# Patient Record
Sex: FEMALE | Race: WHITE | NOT HISPANIC OR LATINO | Employment: FULL TIME | URBAN - METROPOLITAN AREA
[De-identification: names, ages, dates, MRNs, and addresses within clinical notes are randomized per-mention and may not be internally consistent; named-entity substitution may affect disease eponyms.]

---

## 2019-04-24 ENCOUNTER — OFFICE VISIT CONVERTED (OUTPATIENT)
Dept: FAMILY MEDICINE CLINIC | Facility: CLINIC | Age: 59
End: 2019-04-24
Attending: NURSE PRACTITIONER

## 2019-08-07 ENCOUNTER — OFFICE VISIT CONVERTED (OUTPATIENT)
Dept: FAMILY MEDICINE CLINIC | Facility: CLINIC | Age: 59
End: 2019-08-07
Attending: NURSE PRACTITIONER

## 2019-10-02 ENCOUNTER — OFFICE VISIT CONVERTED (OUTPATIENT)
Dept: FAMILY MEDICINE CLINIC | Facility: CLINIC | Age: 59
End: 2019-10-02
Attending: NURSE PRACTITIONER

## 2019-10-02 ENCOUNTER — HOSPITAL ENCOUNTER (OUTPATIENT)
Dept: FAMILY MEDICINE CLINIC | Facility: CLINIC | Age: 59
Discharge: HOME | End: 2019-10-02
Attending: NURSE PRACTITIONER

## 2019-10-02 LAB
25(OH)D3 SERPL-MCNC: 25.7 NG/ML (ref 30–100)
ALBUMIN SERPL-MCNC: 4.5 G/DL (ref 3.5–5)
ALBUMIN/GLOB SERPL: 1.4 {RATIO} (ref 1.4–2.6)
ALP SERPL-CCNC: 60 U/L (ref 53–141)
ALT SERPL-CCNC: 14 U/L (ref 10–40)
ANION GAP SERPL CALC-SCNC: 19 MMOL/L (ref 8–19)
AST SERPL-CCNC: 15 U/L (ref 15–50)
BASOPHILS # BLD AUTO: 0.08 10*3/UL (ref 0–0.2)
BASOPHILS NFR BLD AUTO: 0.7 % (ref 0–3)
BILIRUB SERPL-MCNC: 0.34 MG/DL (ref 0.2–1.3)
BUN SERPL-MCNC: 23 MG/DL (ref 5–25)
BUN/CREAT SERPL: 17 {RATIO} (ref 6–20)
CALCIUM SERPL-MCNC: 9.6 MG/DL (ref 8.7–10.4)
CHLORIDE SERPL-SCNC: 101 MMOL/L (ref 99–111)
CHOLEST SERPL-MCNC: 119 MG/DL (ref 107–200)
CHOLEST/HDLC SERPL: 3 {RATIO} (ref 3–6)
CONV ABS IMM GRAN: 0.04 10*3/UL (ref 0–0.2)
CONV CO2: 23 MMOL/L (ref 22–32)
CONV CREATININE URINE, RANDOM: 161.8 MG/DL (ref 10–300)
CONV IMMATURE GRAN: 0.3 % (ref 0–1.8)
CONV MICROALBUM.,U,RANDOM: 760.4 MG/L (ref 0–20)
CONV TOTAL PROTEIN: 7.7 G/DL (ref 6.3–8.2)
CREAT UR-MCNC: 1.32 MG/DL (ref 0.5–0.9)
DEPRECATED RDW RBC AUTO: 46.3 FL (ref 36.4–46.3)
EOSINOPHIL # BLD AUTO: 0.16 10*3/UL (ref 0–0.7)
EOSINOPHIL # BLD AUTO: 1.3 % (ref 0–7)
ERYTHROCYTE [DISTWIDTH] IN BLOOD BY AUTOMATED COUNT: 13.2 % (ref 11.7–14.4)
EST. AVERAGE GLUCOSE BLD GHB EST-MCNC: 148 MG/DL
GFR SERPLBLD BASED ON 1.73 SQ M-ARVRAT: 44 ML/MIN/{1.73_M2}
GLOBULIN UR ELPH-MCNC: 3.2 G/DL (ref 2–3.5)
GLUCOSE SERPL-MCNC: 75 MG/DL (ref 65–99)
HBA1C MFR BLD: 6.8 % (ref 3.5–5.7)
HCT VFR BLD AUTO: 48.2 % (ref 37–47)
HDLC SERPL-MCNC: 40 MG/DL (ref 40–60)
HGB BLD-MCNC: 15.1 G/DL (ref 12–16)
LDLC SERPL CALC-MCNC: 53 MG/DL (ref 70–100)
LYMPHOCYTES # BLD AUTO: 3.99 10*3/UL (ref 1–5)
LYMPHOCYTES NFR BLD AUTO: 32.9 % (ref 20–45)
MCH RBC QN AUTO: 29.7 PG (ref 27–31)
MCHC RBC AUTO-ENTMCNC: 31.3 G/DL (ref 33–37)
MCV RBC AUTO: 94.7 FL (ref 81–99)
MICROALBUMIN/CREAT UR: 470 MG/G{CRE} (ref 0–35)
MONOCYTES # BLD AUTO: 0.92 10*3/UL (ref 0.2–1.2)
MONOCYTES NFR BLD AUTO: 7.6 % (ref 3–10)
NEUTROPHILS # BLD AUTO: 6.92 10*3/UL (ref 2–8)
NEUTROPHILS NFR BLD AUTO: 57.2 % (ref 30–85)
NRBC CBCN: 0 % (ref 0–0.7)
OSMOLALITY SERPL CALC.SUM OF ELEC: 288 MOSM/KG (ref 273–304)
PLATELET # BLD AUTO: 254 10*3/UL (ref 130–400)
PMV BLD AUTO: 12 FL (ref 9.4–12.3)
POTASSIUM SERPL-SCNC: 4.9 MMOL/L (ref 3.5–5.3)
RBC # BLD AUTO: 5.09 10*6/UL (ref 4.2–5.4)
SODIUM SERPL-SCNC: 138 MMOL/L (ref 135–147)
T4 FREE SERPL-MCNC: 1.5 NG/DL (ref 0.9–1.8)
TRIGL SERPL-MCNC: 129 MG/DL (ref 40–150)
TSH SERPL-ACNC: 1.49 M[IU]/L (ref 0.27–4.2)
VLDLC SERPL-MCNC: 26 MG/DL (ref 5–37)
WBC # BLD AUTO: 12.11 10*3/UL (ref 4.8–10.8)

## 2019-10-03 LAB
CONV HEPATITIS C AB WITH REFLEX TO CONFIRMATION: <0.1 S/CO RATIO (ref 0–0.9)
CONV HEPATITIS COMMENT: NORMAL

## 2020-01-15 ENCOUNTER — HOSPITAL ENCOUNTER (OUTPATIENT)
Dept: FAMILY MEDICINE CLINIC | Facility: CLINIC | Age: 60
Discharge: HOME | End: 2020-01-15
Attending: NURSE PRACTITIONER

## 2020-01-15 ENCOUNTER — OFFICE VISIT CONVERTED (OUTPATIENT)
Dept: FAMILY MEDICINE CLINIC | Facility: CLINIC | Age: 60
End: 2020-01-15
Attending: NURSE PRACTITIONER

## 2020-01-15 LAB
ALBUMIN SERPL-MCNC: 4.1 G/DL (ref 3.5–5)
ALBUMIN/GLOB SERPL: 1.5 {RATIO} (ref 1.4–2.6)
ALP SERPL-CCNC: 32 U/L (ref 53–141)
ALT SERPL-CCNC: 12 U/L (ref 10–40)
ANION GAP SERPL CALC-SCNC: 16 MMOL/L (ref 8–19)
AST SERPL-CCNC: 14 U/L (ref 15–50)
BASOPHILS # BLD AUTO: 0.09 10*3/UL (ref 0–0.2)
BASOPHILS NFR BLD AUTO: 1 % (ref 0–3)
BILIRUB SERPL-MCNC: 0.17 MG/DL (ref 0.2–1.3)
BUN SERPL-MCNC: 11 MG/DL (ref 5–25)
BUN/CREAT SERPL: 16 {RATIO} (ref 6–20)
CALCIUM SERPL-MCNC: 9.8 MG/DL (ref 8.7–10.4)
CHLORIDE SERPL-SCNC: 105 MMOL/L (ref 99–111)
CHOLEST SERPL-MCNC: 121 MG/DL (ref 107–200)
CHOLEST/HDLC SERPL: 2.6 {RATIO} (ref 3–6)
CONV ABS IMM GRAN: 0.02 10*3/UL (ref 0–0.2)
CONV CO2: 27 MMOL/L (ref 22–32)
CONV IMMATURE GRAN: 0.2 % (ref 0–1.8)
CONV TOTAL PROTEIN: 6.8 G/DL (ref 6.3–8.2)
CREAT UR-MCNC: 0.7 MG/DL (ref 0.5–0.9)
DEPRECATED RDW RBC AUTO: 47.1 FL (ref 36.4–46.3)
EOSINOPHIL # BLD AUTO: 0.17 10*3/UL (ref 0–0.7)
EOSINOPHIL # BLD AUTO: 1.9 % (ref 0–7)
ERYTHROCYTE [DISTWIDTH] IN BLOOD BY AUTOMATED COUNT: 12.9 % (ref 11.7–14.4)
EST. AVERAGE GLUCOSE BLD GHB EST-MCNC: 163 MG/DL
GFR SERPLBLD BASED ON 1.73 SQ M-ARVRAT: >60 ML/MIN/{1.73_M2}
GLOBULIN UR ELPH-MCNC: 2.7 G/DL (ref 2–3.5)
GLUCOSE SERPL-MCNC: 64 MG/DL (ref 65–99)
HBA1C MFR BLD: 7.3 % (ref 3.5–5.7)
HCT VFR BLD AUTO: 43.4 % (ref 37–47)
HDLC SERPL-MCNC: 46 MG/DL (ref 40–60)
HGB BLD-MCNC: 13.2 G/DL (ref 12–16)
LDLC SERPL CALC-MCNC: 63 MG/DL (ref 70–100)
LYMPHOCYTES # BLD AUTO: 3.4 10*3/UL (ref 1–5)
LYMPHOCYTES NFR BLD AUTO: 38.9 % (ref 20–45)
MCH RBC QN AUTO: 30.1 PG (ref 27–31)
MCHC RBC AUTO-ENTMCNC: 30.4 G/DL (ref 33–37)
MCV RBC AUTO: 98.9 FL (ref 81–99)
MONOCYTES # BLD AUTO: 0.65 10*3/UL (ref 0.2–1.2)
MONOCYTES NFR BLD AUTO: 7.4 % (ref 3–10)
NEUTROPHILS # BLD AUTO: 4.41 10*3/UL (ref 2–8)
NEUTROPHILS NFR BLD AUTO: 50.6 % (ref 30–85)
NRBC CBCN: 0 % (ref 0–0.7)
OSMOLALITY SERPL CALC.SUM OF ELEC: 293 MOSM/KG (ref 273–304)
PLATELET # BLD AUTO: 310 10*3/UL (ref 130–400)
PMV BLD AUTO: 11.5 FL (ref 9.4–12.3)
POTASSIUM SERPL-SCNC: 4.7 MMOL/L (ref 3.5–5.3)
RBC # BLD AUTO: 4.39 10*6/UL (ref 4.2–5.4)
SODIUM SERPL-SCNC: 143 MMOL/L (ref 135–147)
T4 FREE SERPL-MCNC: 1.3 NG/DL (ref 0.9–1.8)
TRIGL SERPL-MCNC: 58 MG/DL (ref 40–150)
TSH SERPL-ACNC: 1.53 M[IU]/L (ref 0.27–4.2)
VLDLC SERPL-MCNC: 12 MG/DL (ref 5–37)
WBC # BLD AUTO: 8.74 10*3/UL (ref 4.8–10.8)

## 2020-01-16 LAB — 25(OH)D3 SERPL-MCNC: 22.5 NG/ML (ref 30–100)

## 2020-06-04 ENCOUNTER — HOSPITAL ENCOUNTER (OUTPATIENT)
Dept: FAMILY MEDICINE CLINIC | Facility: CLINIC | Age: 60
Discharge: HOME | End: 2020-06-04
Attending: NURSE PRACTITIONER

## 2020-06-04 ENCOUNTER — OFFICE VISIT CONVERTED (OUTPATIENT)
Dept: FAMILY MEDICINE CLINIC | Facility: CLINIC | Age: 60
End: 2020-06-04
Attending: NURSE PRACTITIONER

## 2020-06-04 LAB
25(OH)D3 SERPL-MCNC: 19.4 NG/ML (ref 30–100)
ALBUMIN SERPL-MCNC: 4.3 G/DL (ref 3.5–5)
ALBUMIN/GLOB SERPL: 1.5 {RATIO} (ref 1.4–2.6)
ALP SERPL-CCNC: 41 U/L (ref 53–141)
ALT SERPL-CCNC: 14 U/L (ref 10–40)
ANION GAP SERPL CALC-SCNC: 14 MMOL/L (ref 8–19)
AST SERPL-CCNC: 13 U/L (ref 15–50)
BASOPHILS # BLD AUTO: 0.07 10*3/UL (ref 0–0.2)
BASOPHILS NFR BLD AUTO: 0.8 % (ref 0–3)
BILIRUB SERPL-MCNC: 0.2 MG/DL (ref 0.2–1.3)
BUN SERPL-MCNC: 19 MG/DL (ref 5–25)
BUN/CREAT SERPL: 20 {RATIO} (ref 6–20)
CALCIUM SERPL-MCNC: 9.6 MG/DL (ref 8.7–10.4)
CHLORIDE SERPL-SCNC: 102 MMOL/L (ref 99–111)
CHOLEST SERPL-MCNC: 199 MG/DL (ref 107–200)
CHOLEST/HDLC SERPL: 5 {RATIO} (ref 3–6)
CONV ABS IMM GRAN: 0.03 10*3/UL (ref 0–0.2)
CONV CO2: 28 MMOL/L (ref 22–32)
CONV IMMATURE GRAN: 0.4 % (ref 0–1.8)
CONV TOTAL PROTEIN: 7.2 G/DL (ref 6.3–8.2)
CREAT UR-MCNC: 0.97 MG/DL (ref 0.5–0.9)
DEPRECATED RDW RBC AUTO: 45.7 FL (ref 36.4–46.3)
EOSINOPHIL # BLD AUTO: 0.19 10*3/UL (ref 0–0.7)
EOSINOPHIL # BLD AUTO: 2.2 % (ref 0–7)
ERYTHROCYTE [DISTWIDTH] IN BLOOD BY AUTOMATED COUNT: 13 % (ref 11.7–14.4)
EST. AVERAGE GLUCOSE BLD GHB EST-MCNC: 174 MG/DL
GFR SERPLBLD BASED ON 1.73 SQ M-ARVRAT: >60 ML/MIN/{1.73_M2}
GLOBULIN UR ELPH-MCNC: 2.9 G/DL (ref 2–3.5)
GLUCOSE SERPL-MCNC: 112 MG/DL (ref 65–99)
HBA1C MFR BLD: 7.7 % (ref 3.5–5.7)
HCT VFR BLD AUTO: 46.1 % (ref 37–47)
HDLC SERPL-MCNC: 40 MG/DL (ref 40–60)
HGB BLD-MCNC: 14.3 G/DL (ref 12–16)
LDLC SERPL CALC-MCNC: 120 MG/DL (ref 70–100)
LYMPHOCYTES # BLD AUTO: 3.75 10*3/UL (ref 1–5)
LYMPHOCYTES NFR BLD AUTO: 44.1 % (ref 20–45)
MCH RBC QN AUTO: 29.7 PG (ref 27–31)
MCHC RBC AUTO-ENTMCNC: 31 G/DL (ref 33–37)
MCV RBC AUTO: 95.6 FL (ref 81–99)
MONOCYTES # BLD AUTO: 0.63 10*3/UL (ref 0.2–1.2)
MONOCYTES NFR BLD AUTO: 7.4 % (ref 3–10)
NEUTROPHILS # BLD AUTO: 3.84 10*3/UL (ref 2–8)
NEUTROPHILS NFR BLD AUTO: 45.1 % (ref 30–85)
NRBC CBCN: 0 % (ref 0–0.7)
OSMOLALITY SERPL CALC.SUM OF ELEC: 291 MOSM/KG (ref 273–304)
PLATELET # BLD AUTO: 275 10*3/UL (ref 130–400)
PMV BLD AUTO: 11.8 FL (ref 9.4–12.3)
POTASSIUM SERPL-SCNC: 4.7 MMOL/L (ref 3.5–5.3)
RBC # BLD AUTO: 4.82 10*6/UL (ref 4.2–5.4)
SODIUM SERPL-SCNC: 139 MMOL/L (ref 135–147)
T4 FREE SERPL-MCNC: 1.3 NG/DL (ref 0.9–1.8)
TRIGL SERPL-MCNC: 197 MG/DL (ref 40–150)
TSH SERPL-ACNC: 1.17 M[IU]/L (ref 0.27–4.2)
VLDLC SERPL-MCNC: 39 MG/DL (ref 5–37)
WBC # BLD AUTO: 8.51 10*3/UL (ref 4.8–10.8)

## 2020-10-07 ENCOUNTER — OFFICE VISIT CONVERTED (OUTPATIENT)
Dept: FAMILY MEDICINE CLINIC | Facility: CLINIC | Age: 60
End: 2020-10-07
Attending: NURSE PRACTITIONER

## 2021-03-26 ENCOUNTER — OFFICE VISIT CONVERTED (OUTPATIENT)
Dept: FAMILY MEDICINE CLINIC | Facility: CLINIC | Age: 61
End: 2021-03-26
Attending: NURSE PRACTITIONER

## 2021-03-26 ENCOUNTER — HOSPITAL ENCOUNTER (OUTPATIENT)
Dept: FAMILY MEDICINE CLINIC | Facility: CLINIC | Age: 61
Discharge: HOME | End: 2021-03-26
Attending: NURSE PRACTITIONER

## 2021-03-26 ENCOUNTER — CONVERSION ENCOUNTER (OUTPATIENT)
Dept: FAMILY MEDICINE CLINIC | Facility: CLINIC | Age: 61
End: 2021-03-26

## 2021-03-26 LAB
25(OH)D3 SERPL-MCNC: 23.2 NG/ML (ref 30–100)
ALBUMIN SERPL-MCNC: 3.8 G/DL (ref 3.5–5)
ALBUMIN/GLOB SERPL: 1.3 {RATIO} (ref 1.4–2.6)
ALP SERPL-CCNC: 49 U/L (ref 53–141)
ALT SERPL-CCNC: 15 U/L (ref 10–40)
AMPHET UR QL CFM: NEGATIVE
ANION GAP SERPL CALC-SCNC: 12 MMOL/L (ref 8–19)
AST SERPL-CCNC: 13 U/L (ref 15–50)
BARBITURATES UR QL: NEGATIVE
BASOPHILS # BLD AUTO: 0.06 10*3/UL (ref 0–0.2)
BASOPHILS NFR BLD AUTO: 0.7 % (ref 0–3)
BENZODIAZ UR QL SCN: NEGATIVE
BILIRUB SERPL-MCNC: 0.21 MG/DL (ref 0.2–1.3)
BUN SERPL-MCNC: 13 MG/DL (ref 5–25)
BUN/CREAT SERPL: 16 {RATIO} (ref 6–20)
CALCIUM SERPL-MCNC: 9.6 MG/DL (ref 8.7–10.4)
CHLORIDE SERPL-SCNC: 104 MMOL/L (ref 99–111)
CHOLEST SERPL-MCNC: 100 MG/DL (ref 107–200)
CHOLEST/HDLC SERPL: 2.6 {RATIO} (ref 3–6)
CONV ABS IMM GRAN: 0.03 10*3/UL (ref 0–0.2)
CONV AMP/METHAMP UR: NEGATIVE
CONV CO2: 29 MMOL/L (ref 22–32)
CONV COCAINE, UR: NEGATIVE
CONV CREATININE URINE, RANDOM: 102.8 MG/DL (ref 10–300)
CONV IMMATURE GRAN: 0.4 % (ref 0–1.8)
CONV MICROALBUM.,U,RANDOM: <12 MG/L (ref 0–20)
CONV TOTAL PROTEIN: 6.8 G/DL (ref 6.3–8.2)
CREAT UR-MCNC: 0.8 MG/DL (ref 0.5–0.9)
DEPRECATED RDW RBC AUTO: 42.6 FL (ref 36.4–46.3)
EOSINOPHIL # BLD AUTO: 0.15 10*3/UL (ref 0–0.7)
EOSINOPHIL # BLD AUTO: 1.8 % (ref 0–7)
ERYTHROCYTE [DISTWIDTH] IN BLOOD BY AUTOMATED COUNT: 12.3 % (ref 11.7–14.4)
EST. AVERAGE GLUCOSE BLD GHB EST-MCNC: 226 MG/DL
GFR SERPLBLD BASED ON 1.73 SQ M-ARVRAT: >60 ML/MIN/{1.73_M2}
GLOBULIN UR ELPH-MCNC: 3 G/DL (ref 2–3.5)
GLUCOSE SERPL-MCNC: 190 MG/DL (ref 65–99)
HBA1C MFR BLD: 9.5 % (ref 3.5–5.7)
HCT VFR BLD AUTO: 45.6 % (ref 37–47)
HDLC SERPL-MCNC: 38 MG/DL (ref 40–60)
HGB BLD-MCNC: 14.7 G/DL (ref 12–16)
LDLC SERPL CALC-MCNC: 46 MG/DL (ref 70–100)
LYMPHOCYTES # BLD AUTO: 1.76 10*3/UL (ref 1–5)
LYMPHOCYTES NFR BLD AUTO: 21.1 % (ref 20–45)
MCH RBC QN AUTO: 30.2 PG (ref 27–31)
MCHC RBC AUTO-ENTMCNC: 32.2 G/DL (ref 33–37)
MCV RBC AUTO: 93.8 FL (ref 81–99)
MDMA UR QL SCN: NEGATIVE
METHADONE UR QL SCN: NEGATIVE
MICROALBUMIN/CREAT UR: 11.7 MG/G{CRE} (ref 0–35)
MONOCYTES # BLD AUTO: 0.75 10*3/UL (ref 0.2–1.2)
MONOCYTES NFR BLD AUTO: 9 % (ref 3–10)
NEUTROPHILS # BLD AUTO: 5.59 10*3/UL (ref 2–8)
NEUTROPHILS NFR BLD AUTO: 67 % (ref 30–85)
NRBC CBCN: 0 % (ref 0–0.7)
OPIATES UR QL SCN: NEGATIVE
OSMOLALITY SERPL CALC.SUM OF ELEC: 295 MOSM/KG (ref 273–304)
OXYCODONE UR QL SCN: NEGATIVE
PCP UR QL: NEGATIVE
PLATELET # BLD AUTO: 243 10*3/UL (ref 130–400)
PMV BLD AUTO: 11.7 FL (ref 9.4–12.3)
POTASSIUM SERPL-SCNC: 5.1 MMOL/L (ref 3.5–5.3)
RBC # BLD AUTO: 4.86 10*6/UL (ref 4.2–5.4)
SODIUM SERPL-SCNC: 140 MMOL/L (ref 135–147)
T4 FREE SERPL-MCNC: 1.4 NG/DL (ref 0.9–1.8)
THC SERPLBLD CFM-MCNC: NEGATIVE NG/ML
TRIGL SERPL-MCNC: 80 MG/DL (ref 40–150)
TSH SERPL-ACNC: 0.81 M[IU]/L (ref 0.27–4.2)
VLDLC SERPL-MCNC: 16 MG/DL (ref 5–37)
WBC # BLD AUTO: 8.34 10*3/UL (ref 4.8–10.8)

## 2021-05-13 NOTE — PROGRESS NOTES
Progress Note      Patient Name: Adore Neves   Patient ID: [id number]   Sex: Female   Birthdate: Tamica 15, 1960    Primary Care Provider: Kiesha WEINER   Referring Provider: Kiesha WEINER    Visit Date: June 4, 2020    Provider: HUSAM Hernandez   Location: [facility]   Location Address: [address] [place] [address], [address]  [id number]   Location Phone: [phone]          Chief Complaint  · 6-month follow-up/wellness exam      History Of Present Illness  Adore Neves is a 59 year old /White female who presents for evaluation and treatment of:      Patient is a 59-year-old female who comes in for wellness/6-month follow-up and medication refills.  Patient has previous history of diabetes, hypertension, and hyperlipidemia.  Last hemoglobin A1c was done January at that time it was 7.3.  Cholesterol was within normal limits.  Thyroid level was within normal limits vitamin D was low at 22.  Patient is a current half pack per day smoker, she has a negative depression screening at 2 points.  She is due for colorectal and breast cancer screening.  She is also due for hemoglobin A1c.  Otherwise patient is feeling well, blood pressure stable at 136/72.  She denies any headaches, change in vision.    Patient does have complaints of seasonal allergies and sinus headaches.  She states she typically gets these and over-the-counter medication typically helps she has been taking Tylenol Sinus along with Sudafed without any complete resolution of symptoms.  She denies any fever or chills.  She states they feel just like her previous sinus headaches.  She states they wax and wane in intensity they are intermittent in nature.       Past Medical History  Disease Name Date Onset Notes   Arthritis --  --    Broken Bones --  --    Diabetes --  --    High cholesterol --  --    Mood disorder --  --    Neurologic disorder --  --    Sinus trouble --  --    Stroke --  --          Past  Surgical History  Procedure Name Date Notes   Hysterectomy-Abdominal --  --    Knee replacement, left --  --    Knee replacement, right --  --    Laparoscopic cholecystectomy --  --    Tonsilectomy --  --          Medication List  Name Date Started Instructions   Blood Glucose Monitoring miscellaneous kit 12/02/2019 use as directed BID DX:E11.9   Blood Glucose Test miscellaneous strip 06/04/2020 use as directed BID DX:E11.9   fenofibrate 160 mg oral tablet 06/04/2020 TAKE 1 TABLET DAILY   gabapentin 800 mg oral tablet 04/02/2020 take 1 tablet (800 mg) by oral route 3 times per day for 30 days   glyburide 5 mg oral tablet 06/04/2020 TAKE 1 TABLET DAILY BEFORE BREAKFAST   ipratropium-albuterol 0.5 mg-3 mg(2.5 mg base)/3 mL inhalation solution for nebulization 12/02/2019 inhale 3 milliliters by nebulization route 4 times per day and as needed, up to 6 doses per day for 30 days   lancets 33 gauge miscellaneous misc 12/02/2019 use as directed BID DX:E11.9   Lipitor 80 mg oral tablet 06/04/2020 TAKE 1 TABLET DAILY   metformin 1,000 mg oral tablet 06/04/2020 take 1 tablet (1,000 mg) by oral route 2 times per day with morning and evening meals for 90 days   tramadol 50 mg oral tablet 04/01/2020 take 2 tablets by oral route 2 times a day as needed for 30 days   Vitamin D2 1,250 mcg (50,000 unit) oral capsule 06/04/2020 take 1 capsule by oral route once a week for 90 days         Allergy List  Allergen Name Date Reaction Notes   NO KNOWN DRUG ALLERGIES --  --  --          Family Medical History  Disease Name Relative/Age Notes   Heart Disease Father/  Mother/   sibling   Diabetes, unspecified type Brother/   Brother   Renal Calculus Brother/   Brother   Bladder calculus Brother/   Brother         Social History  Finding Status Start/Stop Quantity Notes   Alcohol Never 0/0 --  drinks no   Caffeine Current every day 0/0 --  drinks regularly; coffee   Second hand smoke exposure Current some day 0/0 --  yes   Tobacco Current  "every day 19/-- <1/2 ppd current everyday smoker; quit smoking at age 19; smoked 20 cigarette(s) per day         Immunizations  NameDate Admin Mfg Trade Name Lot Number Route Inj VIS Given VIS Publication   Ijihsqftb98/02/2019 [facility] Fluzone Quadrivalent [id number] IM RD 10/02/2019    Comments: pt tolerated inj well and left office in stable condition.   Prevnar 1301/15/2020 [facility] PREVNAR 13 [id number] IM RD 01/15/2020    Comments: TOLERATED WELL STABLE CONDITION         Review of Systems  · Constitutional  o Denies  o : fever, fatigue, weight loss, weight gain  · Eyes  o Denies  o : double vision, impaired vision, blurred vision  · HENT  o Admits  o : sinus pain  o Denies  o : vertigo, lightheadedness, nasal congestion  · Cardiovascular  o Denies  o : lower extremity edema, claudication, chest pressure, palpitations  · Respiratory  o Denies  o : shortness of breath, wheezing, cough, hemoptysis, dyspnea on exertion  · Gastrointestinal  o Denies  o : nausea, vomiting, diarrhea, constipation, abdominal pain  · Genitourinary  o Denies  o : urgency, frequency, dysuria  · Integument  o Denies  o : rash, itching, pigmentation changes  · Musculoskeletal  o Denies  o : joint pain, joint swelling, muscle pain  · Psychiatric  o Denies  o : anxiety, depression, suicidal ideation, homicidal ideation      Vitals  Date Time BP Position Site L\R Cuff Size HR RR TEMP (F) WT  HT  BMI kg/m2 BSA m2 O2 Sat        06/04/2020 08:24 /72 Sitting    72 - R  98.2 237lbs 2oz 5'  4\" 40.7 2.2 96 %          Physical Examination  · Constitutional  o Appearance  o : well-nourished, well developed, in no acute distress  · Eyes  o Conjunctivae  o : conjunctivae normal, no exudates present  o Sclerae  o : sclerae white  o Pupils and Irises  o : pupils equal and round, and reactive to light and accomodation bilaterally  o Eyelids/Ocular Adnexae  o : extra ocular movements intact  · Neck  o Inspection/Palpation  o : normal appearance, no masses or " tenderness, no lymphadenopathy, no deformity  o Range of Motion  o : range of motion within normal limits  o Thyroid  o : gland size normal, no nodules or masses present on palpation, nontender,motion normal during swallowing  · Respiratory  o Respiratory Effort  o : breathing unlabored, no accessory muscle use  o Inspection of Chest  o : normal appearance, no retractions  o Auscultation of Lungs  o : normal breath sounds bilaterally  · Cardiovascular  o Heart  o :   § Auscultation of Heart  § : regular rate and rhythm, no murmurs, gallops or rubs  o Peripheral Vascular System  o :   § Extremities  § : no edema  · Neurologic  o Mental Status Examination  o :   § Orientation  § : alert and oriented x3  § Speech/Language  § : normal speech pattern  o Gait and Station  o : normal gait, able to stand without difficulty  · Psychiatric  o Judgement and Insight  o : judgment and insight intact, judgement for everyday activities and social situations within normal limits, insight intact  o Thought Processes  o : rate of thoughts normal, thought content logical  o Mood and Affect  o : mood normal, affect appropriate              Assessment  · Screening for depression     V79.0/Z13.89  Negative depression screening  · Screening for colon cancer     V76.51/Z12.11  Patient is agreeable to Cologuard.  · Visit for screening mammogram     V76.12/Z12.31  We will schedule at [facility]  · Allergic rhinitis due to allergen     477.9/J30.9  Will start on Flonase along with Allegra to see if it helps with the sinus pain and pressure.  · Diabetes mellitus, type 2     250.00/E11.9  · Essential hypertension     401.9/I10  · Hyperlipidemia     272.4/E78.5  · Nicotine dependence     305.1/F17.200  · Polyarthralgia     719.49/M25.50  · Tobacco abuse counseling       Tobacco abuse counseling     V65.42/Z71.6  Discussed tobacco sensation patient verbalized understanding.  · Wellness  examination     V70.0/Z00.00    Problems Reconciled  Plan  · Orders  o Cologuard (68142) - V76.51/Z12.11 - 06/04/2020  o Screening Mammography; Bilateral 2D (, 38992) - V76.12/Z12.31 - 06/04/2020   Schedule at [facility]  o Diabetes 1 Panel (CMP, Lipid, A1c) [facility] (89810, 13356, 77622) - 250.00/E11.9 - 06/04/2020  o CBC with Auto Diff [facility] (42852) - 250.00/E11.9 - 06/04/2020  o Thyroid Profile (40727, 36483, THYII) - 250.00/E11.9 - 06/04/2020  o ACO-17: Screened for tobacco use AND received tobacco cessation intervention (4004F) - 305.1/F17.200 - 06/04/2020  o ACO-17: Screened for tobacco use AND received tobacco cessation intervention (4004F) - V65.42/Z71.6 - 06/04/2020  o Vitamin D (25-Hydroxy) Level (62521) - 250.00/E11.9, 401.9/I10, 272.4/E78.5 - 06/04/2020  o ACO-17: Screened for tobacco use AND received tobacco cessation intervention (4004F) - - 06/04/2020  o ACO-39: Current medications updated and reviewed () - - 06/04/2020  o ACO-18: Negative screen for clinical depression using a standardized tool () - - 06/04/2020   negative 2 pts.  · Medications  o fluticasone propionate 50 mcg/actuation nasal spray,suspension   SIG: spray 1 - 2 sprays in each nostril by intranasal route once daily   DISP: (1) 9.9 ml aer w/adap with 5 refills  Prescribed on 06/04/2020     o fexofenadine 180 mg oral tablet   SIG: take 1 tablet (180 mg) by oral route once daily for 90 days   DISP: (90) tablets with 2 refills  Prescribed on 06/04/2020     o Blood Glucose Test miscellaneous strip   SIG: use as directed BID DX:E11.9   DISP: (1) 50 ct box with 5 refills  Adjusted on 06/04/2020     o fenofibrate 160 mg oral tablet   SIG: TAKE 1 TABLET DAILY   DISP: (90) Tablet with 1 refills  Adjusted on 06/04/2020     o glyburide 5 mg oral tablet   SIG: TAKE 1 TABLET DAILY BEFORE BREAKFAST   DISP: (90) Tablet with 1 refills  Adjusted on 06/04/2020     o Lipitor 80 mg oral tablet   SIG: TAKE 1 TABLET DAILY   DISP: (90)  Tablet with 1 refills  Adjusted on 06/04/2020     o metformin 1,000 mg oral tablet   SIG: take 1 tablet (1,000 mg) by oral route 2 times per day with morning and evening meals for 90 days   DISP: (180) tablet with 1 refills  Adjusted on 06/04/2020     o Vitamin D2 1,250 mcg (50,000 unit) oral capsule   SIG: take 1 capsule by oral route once a week for 90 days   DISP: (13) capsules with 1 refills  Adjusted on 06/04/2020     o Medications have been Reconciled  o Transition of Care or Provider Policy  · Instructions  o Depression Screen completed and scanned into the EMR under the designated folder within the patient's documents.  o Today's PHQ-9 result is __2_  o Continue blood sugar monitoring daily and record. Bring your log to office visits. Call the office for readings below 70 and above 250 or any complications.  o Daily foot care. Avoid walking barefoot. Annual Dilated Eye Exam.  o Discussed with patient blood pressure monitoring, hemoglobin A1C levels need to be below 7.0, and LDL (Lipid) goals below 70.  o Patient advised to monitor blood pressure (B/P) at home and journal readings. Patient informed that a B/P reading at home of more than 130/80 is considered hypertension. For readings greater [id number]/90 or higher patient is advised to follow up in the office with readings for management. Patient advised to limit sodium intake.  o Advised that cheeses and other sources of dairy fats, animal fats, fast food, and the extras (candy, pastries, pies, doughnuts and cookies) all contain LDL raising nutrients. Advised to increase fruits, vegetables, whole grains, and to monitor portion sizes.   o *Form of nicotine being used: Cigarette  o Patient was strongly encouraged to discontinue use of any nicotine containing product or minimize the use of the product.  o (NSAID) Non-steroidal Anti-inflammatory medication was recommended/prescribed. Ensure you take this medication with food as directed. Do not use  Motrin/ibuprofen/Advil while using the anti-inflammatory medication prescribed.  o Tobacco and smoking cessation counseling for more than 3 minutes was completed.  o Take all medications as prescribed/directed.  o Patient was educated/instructed on their diagnosis, treatment and medications prior to discharge from the clinic today.  o Call the office with any concerns or questions.  · Disposition  o Call or Return if symptoms worsen or persist.  o follow up in 6 months  o follow up as needed  o call the office with any questions or concerns            Electronically Signed by: Kiesha Evans APRN -Author on June 4, 2020 08:58:38 AM

## 2021-05-13 NOTE — PROGRESS NOTES
Progress Note      Patient Name: Adore Neves   Patient ID: [id number]   Sex: Female   Birthdate: Tamica 15, 1960    Primary Care Provider: Kiesha WEINER   Referring Provider: Kiesha WEINER    Visit Date: October 7, 2020    Provider: HUSAM Hernandez   Location: [facility]   Location Address: [address] [place] [address], [address] [place] [address]  [id number]   Location Phone: [phone]          Chief Complaint  · Med refill      History Of Present Illness  Adore Neves is a 60 year old /White female who presents for evaluation and treatment of:      60-year-old female who comes in for medication refills.  Patient seen medication refills on her tramadol, gabapentin, Lipitor, allergy medication.  Patient UDS and Ole are appropriate and up-to-date.  Blood pressure stable today at 138/72.  She would like a flu shot today.  She is a current every day smoker.  She has a pending order for Cologuard and mammogram.  She otherwise feels well, she is diabetic states her blood sugars been running about 120 when she awakes in the morning.  Last hemoglobin A1c was in June at 7.7.       Past Medical History  Disease Name Date Onset Notes   Arthritis --  --    Broken Bones --  --    Diabetes --  --    High cholesterol --  --    Mood disorder --  --    Neurologic disorder --  --    Sinus trouble --  --    Stroke --  --          Past Surgical History  Procedure Name Date Notes   Hysterectomy-Abdominal --  --    Knee replacement, left --  --    Knee replacement, right --  --    Laparoscopic cholecystectomy --  --    Tonsilectomy --  --          Medication List  Name Date Started Instructions   Blood Glucose Monitoring miscellaneous kit 12/02/2019 use as directed BID DX:E11.9   Blood Glucose Test miscellaneous strip 06/04/2020 use as directed BID DX:E11.9   fenofibrate 160 mg oral tablet 06/04/2020 TAKE 1 TABLET DAILY   fexofenadine 180 mg oral tablet 10/07/2020 take 1 tablet (180  mg) by oral route once daily for 90 days   fluticasone propionate 50 mcg/actuation nasal spray,suspension 10/07/2020 spray 1 - 2 sprays in each nostril by intranasal route once daily   gabapentin 600 mg oral tablet 10/07/2020 take 1 tablet by oral route 4 times a day for 30 days   glyburide 5 mg oral tablet 06/04/2020 TAKE 1 TABLET DAILY BEFORE BREAKFAST   ipratropium-albuterol 0.5 mg-3 mg(2.5 mg base)/3 mL inhalation solution for nebulization 12/02/2019 inhale 3 milliliters by nebulization route 4 times per day and as needed, up to 6 doses per day for 30 days   lancets 33 gauge miscellaneous misc 12/02/2019 use as directed BID DX:E11.9   Lipitor 80 mg oral tablet 10/07/2020 TAKE 1 TABLET DAILY   metformin 1,000 mg oral tablet 06/04/2020 take 1 tablet (1,000 mg) by oral route 2 times per day with morning and evening meals for 90 days   tramadol 50 mg oral tablet 10/07/2020 take 2 tablets by oral route 2 times a day as needed for 30 days   Vitamin D2 1,250 mcg (50,000 unit) oral capsule 10/07/2020 take 1 capsule by oral route once a week for 90 days         Allergy List  Allergen Name Date Reaction Notes   NO KNOWN DRUG ALLERGIES --  --  --          Family Medical History  Disease Name Relative/Age Notes   Heart Disease Father/  Mother/   sibling   Diabetes, unspecified type Brother/   Brother   Renal Calculus Brother/   Brother   Bladder calculus Brother/   Brother         Social History  Finding Status Start/Stop Quantity Notes   Alcohol Never 0/0 --  drinks no   Caffeine Current every day 0/0 --  drinks regularly; coffee   Second hand smoke exposure Current some day 0/0 --  yes   Tobacco Current every day 19/-- <1/2 ppd current everyday smoker; quit smoking at age 19; smoked 20 cigarette(s) per day         Immunizations  NameDate Admin Mfg Trade Name Lot Number Route Inj VIS Given VIS Publication   Qgnqpyhsw53/07/2020 [facility] Fluzone Quadrivalent [id number] IM LD 10/07/2020 08/15/2019   Comments: tolerated well, stable  "  Prevnar 1301/15/2020 [facility] PREVNAR 13 [id number] IM RD 01/15/2020    Comments: TOLERATED WELL STABLE CONDITION         Review of Systems  · Constitutional  o Denies  o : fever, fatigue, weight loss, weight gain  · HENT  o Denies  o : headaches, vertigo, lightheadedness  · Cardiovascular  o Denies  o : lower extremity edema, claudication, chest pressure, palpitations  · Respiratory  o Denies  o : shortness of breath, wheezing, cough, hemoptysis, dyspnea on exertion  · Gastrointestinal  o Denies  o : nausea, vomiting, diarrhea, constipation, abdominal pain  · Integument  o Denies  o : rash, itching, pigmentation changes  · Musculoskeletal  o Admits  o : joint pain, muscle pain  o Denies  o : joint swelling      Vitals  Date Time BP Position Site L\R Cuff Size HR RR TEMP (F) WT  HT  BMI kg/m2 BSA m2 O2 Sat FR L/min FiO2        10/07/2020 08:12 /72 Sitting    96 - R  97.6 238lbs 0oz 5'  4\" 40.85 2.21 86 %            Physical Examination  · Constitutional  o Appearance  o : well-nourished, well developed, in no acute distress  · Eyes  o Conjunctivae  o : conjunctivae normal, no exudates present  o Sclerae  o : sclerae white  o Pupils and Irises  o : pupils equal and round, and reactive to light and accomodation bilaterally  o Eyelids/Ocular Adnexae  o : extra ocular movements intact  · Respiratory  o Respiratory Effort  o : breathing unlabored, no accessory muscle use  o Inspection of Chest  o : normal appearance, no retractions  o Auscultation of Lungs  o : normal breath sounds bilaterally  · Cardiovascular  o Heart  o :   § Auscultation of Heart  § : regular rate and rhythm, no murmurs, gallops or rubs  o Peripheral Vascular System  o :   § Extremities  § : no edema  · Neurologic  o Mental Status Examination  o :   § Orientation  § : alert and oriented x3  § Speech/Language  § : normal speech pattern  o Gait and Station  o : normal gait, able to stand without difficulty  · Psychiatric  o Judgement and Insight  o : " judgment and insight intact, judgement for everyday activities and social situations within normal limits, insight intact  o Thought Processes  o : rate of thoughts normal, thought content logical  o Mood and Affect  o : mood normal, affect appropriate              Assessment  · Allergic rhinitis due to allergen     477.9/J30.9  · Hyperlipidemia     272.4/E78.5  · Nicotine dependence     305.1/F17.200  · Obesity     278.00/E66.9  · Tobacco abuse counseling       Tobacco abuse counseling     V65.42/Z71.6  · Vitamin D deficiency     268.9/E55.9  · Need for influenza vaccination     V04.81/Z23  · Neuropathy     355.9/G62.9  · Follow up     V67.9/Z09      Plan  · Orders  o ACO-17: Screened for tobacco use AND received tobacco cessation intervention (4004F) - 305.1/F17.200 - 10/07/2020  o ACO-17: Screened for tobacco use AND received tobacco cessation intervention (4004F) - V65.42/Z71.6 - 10/07/2020  o Immunization Admin Fee (Single) ([facility]) (74696) - V04.81/Z23 - 10/07/2020  o Fluarix QUADRIVALENT Vaccine, Syringe, Latex Free, Preservative Free, age 3+ (12937) - V04.81/Z23 - 10/07/2020   Vaccine - Influenza; Dose: 0.5; Site: Left Deltoid; Route: Intramuscular; Date: 10/07/2020 09:08:00; Exp: 06/30/2021; Lot: [id number]; Mfg: [employer]; TradeName: Fluzone Quadrivalent; Administered By: Sabrina Acevedo MA; Comment: tolerated well, stable  o ACO-39: Current medications updated and reviewed (, 1159F) - - 10/07/2020  · Medications  o fexofenadine 180 mg oral tablet   SIG: take 1 tablet (180 mg) by oral route once daily for 90 days   DISP: (90) Tablet with 2 refills  Adjusted on 10/07/2020     o fluticasone propionate 50 mcg/actuation nasal spray,suspension   SIG: spray 1 - 2 sprays in each nostril by intranasal route once daily   DISP: (1) Bottle with 5 refills  Adjusted on 10/07/2020     o gabapentin 600 mg oral tablet   SIG: take 1 tablet by oral route 4 times a day for 30 days   DISP: (120) Tablet with 2  refills  Adjusted on 10/07/2020     o Lipitor 80 mg oral tablet   SIG: TAKE 1 TABLET DAILY   DISP: (90) Tablet with 1 refills  Adjusted on 10/07/2020     o tramadol 50 mg oral tablet   SIG: take 2 tablets by oral route 2 times a day as needed for 30 days   DISP: (120) Tablet with 2 refills  Adjusted on 10/07/2020     o Vitamin D2 1,250 mcg (50,000 unit) oral capsule   SIG: take 1 capsule by oral route once a week for 90 days   DISP: (13) Capsule with 1 refills  Adjusted on 10/07/2020     · Instructions  o Recommended exercise program to assist with cholesterol, weight loss and overall health improvement.  o Advised that cheeses and other sources of dairy fats, animal fats, fast food, and the extras (candy, pastries, pies, doughnuts and cookies) all contain LDL raising nutrients. Advised to increase fruits, vegetables, whole grains, and to monitor portion sizes.   o *Form of nicotine being used: Cigarette  o Patient was strongly encouraged to discontinue use of any nicotine containing product or minimize the use of the product.  o Tobacco and smoking cessation counseling for more than 3 minutes was completed.  o Obtained a written consent for LEAH query. Discussed the risk and benefits of the use of controlled substances with the patient, including the risk of tolerance and drug dependence. The patient has been counseled on the need to have an exit strategy, including potentially discontinuing the use of controlled substances. LEAH has or will be reviewed as soon as it becomes avaliable.  o Take all medications as prescribed/directed.  o Patient was educated/instructed on their diagnosis, treatment and medications prior to discharge from the clinic today.  o Call the office with any concerns or questions.  · Disposition  o Call or Return if symptoms worsen or persist.  o follow up in 6 months  o follow up as needed  o call the office with any questions or concerns            Electronically Signed by: Kiesha MCLAIN  JOSHUA EvansN -Author on October 7, 2020 09:24:02 AM

## 2021-05-14 VITALS
HEART RATE: 96 BPM | BODY MASS INDEX: 40.63 KG/M2 | WEIGHT: 238 LBS | SYSTOLIC BLOOD PRESSURE: 138 MMHG | DIASTOLIC BLOOD PRESSURE: 72 MMHG | OXYGEN SATURATION: 86 % | TEMPERATURE: 97.6 F | HEIGHT: 64 IN

## 2021-05-14 VITALS
OXYGEN SATURATION: 97 % | WEIGHT: 232.25 LBS | HEIGHT: 64 IN | HEART RATE: 90 BPM | SYSTOLIC BLOOD PRESSURE: 136 MMHG | BODY MASS INDEX: 39.65 KG/M2 | DIASTOLIC BLOOD PRESSURE: 70 MMHG

## 2021-05-14 NOTE — PROGRESS NOTES
Progress Note      Patient Name: Adore Neves   Patient ID: [id number]   Sex: Female   Birthdate: Tamica 15, 1960    Primary Care Provider: Kiesha WEINER   Referring Provider: Kiesha WEINER    Visit Date: March 26, 2021    Provider: HUSAM Hernandez   Location: [facility]   Location Address: [address] [place] [address], [address] [place] [address]  [id number]   Location Phone: [phone]          Chief Complaint  · Annual physical exam/medication refills      History Of Present Illness  Adore Neves is a 60 year old /White female who presents for evaluation and treatment of:      Patient is a 60-year-old female who comes in today for annual physical exam.  She has received her flu vaccine.  She is a current 1/2 pack/day smoker.  Never had colorectal screening.  Last mammogram was 2 years ago.  Last hemoglobin A1c was June 2020 was 7.7.  Last eye and foot exam was in 2020.  Blood pressure stable at 136/70.  She denies any headache, chest pain or change in vision. 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  She is due for mammogram, she declines it at this time she is due for colorectal screening she is agreeable to [facility].       Past Medical History  Disease Name Date Onset Notes   Arthritis --  --    Broken Bones --  --    Diabetes --  --    High cholesterol --  --    Mood disorder --  --    Neurologic disorder --  --    Sinus trouble --  --    Stroke --  --          Past Surgical History  Procedure Name Date Notes   Hysterectomy-Abdominal --  --    Knee replacement, left --  --    Knee replacement, right --  --    Laparoscopic cholecystectomy --  --    Tonsilectomy --  --          Medication List  Name Date Started Instructions   Blood Glucose Monitoring miscellaneous kit 12/02/2019 use as directed BID DX:E11.9   Blood Glucose Test miscellaneous strip 06/04/2020 use as directed BID DX:E11.9    ergocalciferol (vitamin D2) 1,250 mcg (50,000 unit) oral capsule 02/08/2021 TAKE 1 CAPSULE ONCE WEEKLY   fenofibrate 160 mg oral tablet 03/26/2021 TAKE 1 TABLET DAILY   fexofenadine 180 mg oral tablet 03/26/2021 take 1 tablet (180 mg) by oral route once daily for 90 days   fluticasone propionate 50 mcg/actuation nasal spray,suspension 03/26/2021 spray 1 - 2 sprays in each nostril by intranasal route once daily   gabapentin 600 mg oral tablet 03/26/2021 take 1 tablet by oral route 4 times a day for 30 days   glyburide 5 mg oral tablet 03/26/2021 take 1 tablet by oral route 2 times a day for 90 days   ipratropium-albuterol 0.5 mg-3 mg(2.5 mg base)/3 mL inhalation solution for nebulization 12/02/2019 inhale 3 milliliters by nebulization route 4 times per day and as needed, up to 6 doses per day for 30 days   lancets 33 gauge miscellaneous misc 12/02/2019 use as directed BID DX:E11.9   Lipitor 80 mg oral tablet 03/26/2021 TAKE 1 TABLET DAILY   metformin 1,000 mg oral tablet 03/26/2021 TAKE 1 TABLET TWICE A DAY WITH THE MORNING AND EVENING MEALS   tramadol 50 mg oral tablet 03/26/2021 take 2 tablets by oral route 2 times a day as needed for 30 days   Vitamin D2 1,250 mcg (50,000 unit) oral capsule 10/07/2020 take 1 capsule by oral route once a week for 90 days         Allergy List  Allergen Name Date Reaction Notes   NO KNOWN DRUG ALLERGIES --  --  --        Allergies Reconciled  Family Medical History  Disease Name Relative/Age Notes   Heart Disease Father/  Mother/   sibling   Diabetes, unspecified type Brother/   Brother   Renal Calculus Brother/   Brother   Bladder calculus Brother/   Brother         Social History  Finding Status Start/Stop Quantity Notes   Alcohol Never 0/0 --  drinks no   Caffeine Current every day 0/0 --  drinks regularly; coffee   Second hand smoke exposure Current some day 0/0 --  yes   Tobacco Current every day 18/-- <1/2 ppd current everyday smoker; quit smoking at age 19; smoked 20  "cigarette(s) per day         Immunizations  NameDate Admin Mfg Trade Name Lot Number Route Inj VIS Given VIS Publication   Vpagwvjfc78/07/2020 SKB Fluzone Quadrivalent [id number] IM LD 10/07/2020 08/15/2019   Comments: tolerated well, stable   Prevnar 1301/15/2020 [facility] PREVNAR 13 [id number] IM RD 01/15/2020    Comments: TOLERATED WELL STABLE CONDITION         Review of Systems  · Constitutional  o Denies  o : fever, fatigue, weight loss, weight gain  · Eyes  o Denies  o : impaired vision, blurred vision, changes in vision  · HENT  o Denies  o : headaches, vertigo, lightheadedness  · Cardiovascular  o Denies  o : lower extremity edema, claudication, chest pressure, palpitations  · Respiratory  o Denies  o : shortness of breath, wheezing, cough, hemoptysis, dyspnea on exertion  · Gastrointestinal  o Denies  o : nausea, vomiting, diarrhea, constipation, abdominal pain  · Integument  o Denies  o : rash, itching, pigmentation changes  · Musculoskeletal  o Admits  o : joint pain, muscle pain, back pain, knee pain  o Denies  o : joint swelling  · Endocrine  o Admits  o : central obesity  o Denies  o : polyuria, polydipsia, weight gain  · Psychiatric  o Denies  o : anxiety, depression, suicidal ideation, homicidal ideation      Vitals  Date Time BP Position Site L\R Cuff Size HR RR TEMP (F) WT  HT  BMI kg/m2 BSA m2 O2 Sat FR L/min FiO2 HC       03/26/2021 08:11 /70 Sitting    90 - R   232lbs 4oz 5'  4\" 39.87 2.18 97 %            Physical Examination  · Constitutional  o Appearance  o : obese, well developed, alert  · Eyes  o Conjunctivae  o : conjunctivae normal, no exudates present  o Sclerae  o : sclerae white  o Pupils and Irises  o : pupils equal and round, and reactive to light and accomodation bilaterally  o Eyelids/Ocular Adnexae  o : extra ocular movements intact  · Respiratory  o Respiratory Effort  o : breathing unlabored, no accessory muscle use  o Inspection of Chest  o : normal appearance, no " retractions  o Auscultation of Lungs  o : normal breath sounds bilaterally  · Cardiovascular  o Heart  o :   § Auscultation of Heart  § : regular rate and rhythm, no murmurs, gallops or rubs  o Peripheral Vascular System  o :   § Extremities  § : no edema  · Neurologic  o Mental Status Examination  o :   § Orientation  § : alert and oriented x3  § Speech/Language  § : normal speech pattern  o Gait and Station  o : normal gait, able to stand without difficulty  · Psychiatric  o Judgement and Insight  o : judgment and insight intact, judgement for everyday activities and social situations within normal limits, insight intact  o Thought Processes  o : rate of thoughts normal, thought content logical  o Mood and Affect  o : mood normal, affect appropriate          Results  · In-Office Procedures  o Lab procedure  § IOP - Urine Drug Screen In-House [facility] (40667)   § Amphetamines Ur Ql: Negative   § Barbiturates Ur Ql: Negative   § Buprenorphine+Nor Ur Ql Scn: Negative   § Benzodiaz Ur Ql: Negative   § Cocaine Ur Ql: Negative   § Methadone Ur Ql: Negative   § Methamphet Ur Ql: Negative   § MDMA Ur Ql Scn: Negative   § Opiates Ur Ql: Negative   § Oxycodone Ur Ql: Negative   § PCP Ur Ql: Negative   § THC Ur Ql: Negative   § Temp in Range?: Within/Acceptable   § Control Seen?: Yes       Assessment  · Screening for colon cancer     V76.51/Z12.11  · Visit for screening mammogram     V76.12/Z12.31  · Annual physical exam     V70.0/Z00.00  · Diabetes mellitus, type 2     250.00/E11.9  Patient states her blood sugars are running between 140 and 200. She is currently on glyburide 5 mg we will increase it to twice a day instead of daily. We will recheck labs today. Discussed diet changes. Patient verbalized understanding.  · Essential hypertension     401.9/I10  · Hyperlipidemia     272.4/E78.5  · Nicotine dependence     305.1/F17.200  · Tobacco abuse counseling       Tobacco abuse counseling     V65.42/Z71.6  · Vitamin D  deficiency     268.9/E55.9      Plan  · Orders  o Cologuard (57631) - V76.51/Z12.11 - 03/26/2021  o Diabetes 2 Panel (Urine Microalbumin, CMP, Lipid, A1c, ) [facility] (02558, 16139, 19971, 19811) - 250.00/E11.9 - 03/26/2021  o CBC with Auto Diff [facility] (54304) - 250.00/E11.9 - 03/26/2021  o Thyroid Profile (72117, THYII, 80341) - 250.00/E11.9 - 03/26/2021  o ACO-17: Screened for tobacco use AND received tobacco cessation intervention (4004F) - 305.1/F17.200 - 03/26/2021  o ACO-17: Screened for tobacco use AND received tobacco cessation intervention (4004F) - V65.42/Z71.6 - 03/26/2021  o Vitamin D Level (53274) - 268.9/E55.9 - 03/26/2021  o ACO-14: Influenza immunization administered or previously received [facility] () - - 03/26/2021  o ACO-39: Current medications updated and reviewed (, 1159F) - - 03/26/2021  · Medications  o fenofibrate 160 mg oral tablet   SIG: TAKE 1 TABLET DAILY   DISP: (90) Tablet with 1 refills  Adjusted on 03/26/2021     o fexofenadine 180 mg oral tablet   SIG: take 1 tablet (180 mg) by oral route once daily for 90 days   DISP: (90) Tablet with 2 refills  Adjusted on 03/26/2021     o fluticasone propionate 50 mcg/actuation nasal spray,suspension   SIG: spray 1 - 2 sprays in each nostril by intranasal route once daily   DISP: (1) Bottle with 5 refills  Adjusted on 03/26/2021     o gabapentin 600 mg oral tablet   SIG: take 1 tablet by oral route 4 times a day for 30 days   DISP: (120) Tablet with 2 refills  Adjusted on 03/26/2021     o glyburide 5 mg oral tablet   SIG: take 1 tablet by oral route 2 times a day for 90 days   DISP: (180) Tablet with 1 refills  Adjusted on 03/26/2021     o Lipitor 80 mg oral tablet   SIG: TAKE 1 TABLET DAILY   DISP: (90) Tablet with 1 refills  Adjusted on 03/26/2021     o metformin 1,000 mg oral tablet   SIG: TAKE 1 TABLET TWICE A DAY WITH THE MORNING AND EVENING MEALS   DISP: (180) Tablet with 1 refills  Adjusted on 03/26/2021     o tramadol 50 mg oral tablet   SIG:  take 2 tablets by oral route 2 times a day as needed for 30 days   DISP: (120) Tablet with 2 refills  Adjusted on 03/26/2021     o Medications have been Reconciled  o Transition of Care or Provider Policy  · Instructions  o Reviewed health maintenance flowsheet and updated information. Orders were placed and/or patient's response was documented.  o Continue blood sugar monitoring daily and record. Bring your log to office visits. Call the office for readings below 70 and above 250 or any complications.  o Daily foot care. Avoid walking barefoot. Annual Dilated Eye Exam.  o Discussed with patient blood pressure monitoring, hemoglobin A1C levels need to be below 7.0, and LDL (Lipid) goals below 70.  o Patient advised to monitor blood pressure (B/P) at home and journal readings. Patient informed that a B/P reading at home of more than 130/80 is considered hypertension. For readings greater [id number]/90 or higher patient is advised to follow up in the office with readings for management. Patient advised to limit sodium intake.  o Advised that cheeses and other sources of dairy fats, animal fats, fast food, and the extras (candy, pastries, pies, doughnuts and cookies) all contain LDL raising nutrients. Advised to increase fruits, vegetables, whole grains, and to monitor portion sizes.   o *Form of nicotine being used: Cigarette  o Patient was strongly encouraged to discontinue use of any nicotine containing product or minimize the use of the product.  o Tobacco and smoking cessation counseling for more than 3 minutes was completed.  o Take all medications as prescribed/directed.  o Patient was educated/instructed on their diagnosis, treatment and medications prior to discharge from the clinic today.  o Call the office with any concerns or questions.  · Disposition  o Call or Return if symptoms worsen or persist.  o follow up in 6 months  o follow up as needed  o call the office with any questions or  concerns            Electronically Signed by: Kiesha Evans APRN -Author on March 26, 2021 10:48:15 AM

## 2021-05-15 VITALS
TEMPERATURE: 98.2 F | OXYGEN SATURATION: 96 % | SYSTOLIC BLOOD PRESSURE: 136 MMHG | HEIGHT: 64 IN | HEART RATE: 72 BPM | WEIGHT: 237.12 LBS | BODY MASS INDEX: 40.48 KG/M2 | DIASTOLIC BLOOD PRESSURE: 72 MMHG

## 2021-05-15 VITALS
HEIGHT: 64 IN | BODY MASS INDEX: 37.77 KG/M2 | DIASTOLIC BLOOD PRESSURE: 68 MMHG | SYSTOLIC BLOOD PRESSURE: 122 MMHG | TEMPERATURE: 98.1 F | OXYGEN SATURATION: 93 % | HEART RATE: 84 BPM | WEIGHT: 221.25 LBS

## 2021-05-15 VITALS
TEMPERATURE: 98.8 F | BODY MASS INDEX: 38.58 KG/M2 | SYSTOLIC BLOOD PRESSURE: 128 MMHG | OXYGEN SATURATION: 92 % | DIASTOLIC BLOOD PRESSURE: 82 MMHG | HEIGHT: 64 IN | WEIGHT: 226 LBS | HEART RATE: 100 BPM

## 2021-05-15 VITALS
DIASTOLIC BLOOD PRESSURE: 82 MMHG | WEIGHT: 238 LBS | BODY MASS INDEX: 40.63 KG/M2 | OXYGEN SATURATION: 94 % | TEMPERATURE: 98.9 F | HEIGHT: 64 IN | SYSTOLIC BLOOD PRESSURE: 138 MMHG | HEART RATE: 74 BPM

## 2021-05-15 VITALS
TEMPERATURE: 98.2 F | HEIGHT: 64 IN | HEART RATE: 66 BPM | WEIGHT: 241 LBS | SYSTOLIC BLOOD PRESSURE: 146 MMHG | OXYGEN SATURATION: 93 % | BODY MASS INDEX: 41.15 KG/M2 | DIASTOLIC BLOOD PRESSURE: 88 MMHG

## 2021-06-22 DIAGNOSIS — R52 PAIN: Primary | ICD-10-CM

## 2021-06-23 RX ORDER — TRAMADOL HYDROCHLORIDE 50 MG/1
TABLET ORAL
Qty: 120 TABLET | Refills: 0 | Status: SHIPPED | OUTPATIENT
Start: 2021-06-23 | End: 2021-07-20 | Stop reason: SDUPTHER

## 2021-07-20 DIAGNOSIS — R52 PAIN: ICD-10-CM

## 2021-07-20 RX ORDER — TRAMADOL HYDROCHLORIDE 50 MG/1
100 TABLET ORAL EVERY 6 HOURS PRN
Qty: 120 TABLET | Refills: 1 | Status: SHIPPED | OUTPATIENT
Start: 2021-07-20 | End: 2021-09-09 | Stop reason: SDUPTHER

## 2021-07-20 RX ORDER — TRAMADOL HYDROCHLORIDE 50 MG/1
50 TABLET ORAL
Qty: 120 TABLET | Refills: 0 | OUTPATIENT
Start: 2021-07-20

## 2021-07-20 NOTE — TELEPHONE ENCOUNTER
Caller: Adore Neves    Relationship: Self    Best call back number: [phone]    Medication needed:   Requested Prescriptions     Pending Prescriptions Disp Refills   • traMADol (ULTRAM) 50 MG tablet 120 tablet 0       When do you need the refill by: 7/20/21    Does the patient have less than a 3 day supply:  [] Yes  [x] No    What is the patient's preferred pharmacy: [id number] - [address] - [phone] [facility] [phone] FX

## 2021-08-06 RX ORDER — ERGOCALCIFEROL 1.25 MG/1
1 CAPSULE ORAL WEEKLY
COMMUNITY
Start: 2021-02-08 | End: 2021-08-06 | Stop reason: SDUPTHER

## 2021-08-06 RX ORDER — FEXOFENADINE HCL 180 MG/1
180 TABLET ORAL DAILY
COMMUNITY
End: 2021-08-06 | Stop reason: SDUPTHER

## 2021-08-06 RX ORDER — ERGOCALCIFEROL 1.25 MG/1
1 CAPSULE ORAL WEEKLY
Qty: 13 CAPSULE | Refills: 0 | Status: SHIPPED | OUTPATIENT
Start: 2021-08-06 | End: 2021-08-11 | Stop reason: SDUPTHER

## 2021-08-06 RX ORDER — FEXOFENADINE HCL 180 MG/1
180 TABLET ORAL DAILY
Qty: 90 TABLET | Refills: 1 | Status: SHIPPED | OUTPATIENT
Start: 2021-08-06 | End: 2021-08-11 | Stop reason: SDUPTHER

## 2021-08-09 RX ORDER — GABAPENTIN 600 MG/1
1 TABLET ORAL 4 TIMES DAILY
COMMUNITY
Start: 2021-08-03 | End: 2021-08-11 | Stop reason: SDUPTHER

## 2021-08-09 RX ORDER — IPRATROPIUM BROMIDE AND ALBUTEROL SULFATE 2.5; .5 MG/3ML; MG/3ML
3 SOLUTION RESPIRATORY (INHALATION) EVERY 4 HOURS PRN
COMMUNITY
End: 2021-08-11

## 2021-08-09 RX ORDER — FENOFIBRATE 160 MG/1
160 TABLET ORAL DAILY
COMMUNITY
End: 2021-08-11 | Stop reason: SDUPTHER

## 2021-08-09 RX ORDER — GLYBURIDE 5 MG/1
1 TABLET ORAL 2 TIMES DAILY
COMMUNITY
End: 2022-01-20 | Stop reason: SDUPTHER

## 2021-08-09 RX ORDER — ATORVASTATIN CALCIUM 80 MG/1
80 TABLET, FILM COATED ORAL DAILY
COMMUNITY
End: 2021-08-11 | Stop reason: SDUPTHER

## 2021-08-09 RX ORDER — FLUTICASONE PROPIONATE 50 MCG
2 SPRAY, SUSPENSION (ML) NASAL DAILY
COMMUNITY
End: 2021-08-11 | Stop reason: SDUPTHER

## 2021-08-11 ENCOUNTER — OFFICE VISIT (OUTPATIENT)
Dept: FAMILY MEDICINE CLINIC | Facility: CLINIC | Age: 61
End: 2021-08-11

## 2021-08-11 VITALS
BODY MASS INDEX: 37.73 KG/M2 | OXYGEN SATURATION: 96 % | HEART RATE: 88 BPM | SYSTOLIC BLOOD PRESSURE: 136 MMHG | WEIGHT: 221 LBS | HEIGHT: 64 IN | DIASTOLIC BLOOD PRESSURE: 82 MMHG

## 2021-08-11 DIAGNOSIS — G89.4 CHRONIC PAIN SYNDROME: ICD-10-CM

## 2021-08-11 DIAGNOSIS — Z76.0 MEDICATION REFILL: ICD-10-CM

## 2021-08-11 DIAGNOSIS — Z78.9 INPATIENT HOSPITALIZATION WITHIN LAST 30 DAYS: Primary | ICD-10-CM

## 2021-08-11 DIAGNOSIS — J18.9 PNEUMONIA DUE TO INFECTIOUS ORGANISM, UNSPECIFIED LATERALITY, UNSPECIFIED PART OF LUNG: ICD-10-CM

## 2021-08-11 DIAGNOSIS — Z99.81 OXYGEN DEPENDENT: ICD-10-CM

## 2021-08-11 DIAGNOSIS — J44.1 COPD WITH EXACERBATION (HCC): ICD-10-CM

## 2021-08-11 PROCEDURE — 99495 TRANSJ CARE MGMT MOD F2F 14D: CPT | Performed by: NURSE PRACTITIONER

## 2021-08-11 RX ORDER — MULTIPLE VITAMINS W/ MINERALS TAB 9MG-400MCG
1 TAB ORAL DAILY
COMMUNITY
End: 2021-08-11 | Stop reason: SDUPTHER

## 2021-08-11 RX ORDER — GABAPENTIN 600 MG/1
600 TABLET ORAL 4 TIMES DAILY
Qty: 360 TABLET | Refills: 1 | Status: SHIPPED | OUTPATIENT
Start: 2021-08-11 | End: 2021-09-01

## 2021-08-11 RX ORDER — MULTIPLE VITAMINS W/ MINERALS TAB 9MG-400MCG
1 TAB ORAL DAILY
Qty: 90 TABLET | Refills: 1 | Status: SHIPPED | OUTPATIENT
Start: 2021-08-11 | End: 2022-04-20 | Stop reason: SDUPTHER

## 2021-08-11 RX ORDER — ATORVASTATIN CALCIUM 80 MG/1
80 TABLET, FILM COATED ORAL DAILY
Qty: 90 TABLET | Refills: 1 | Status: SHIPPED | OUTPATIENT
Start: 2021-08-11 | End: 2022-01-20 | Stop reason: SDUPTHER

## 2021-08-11 RX ORDER — IPRATROPIUM BROMIDE AND ALBUTEROL SULFATE 2.5; .5 MG/3ML; MG/3ML
3 SOLUTION RESPIRATORY (INHALATION) EVERY 4 HOURS PRN
Qty: 360 ML | Refills: 1 | Status: SHIPPED | OUTPATIENT
Start: 2021-08-11

## 2021-08-11 RX ORDER — FLUCONAZOLE 150 MG/1
150 TABLET ORAL ONCE
Qty: 1 TABLET | Refills: 0 | Status: SHIPPED | OUTPATIENT
Start: 2021-08-11 | End: 2021-08-11

## 2021-08-11 RX ORDER — FEXOFENADINE HCL 180 MG/1
180 TABLET ORAL DAILY
Qty: 90 TABLET | Refills: 1 | Status: SHIPPED | OUTPATIENT
Start: 2021-08-11 | End: 2022-01-20 | Stop reason: SDUPTHER

## 2021-08-11 RX ORDER — FENOFIBRATE 160 MG/1
160 TABLET ORAL DAILY
Qty: 90 TABLET | Refills: 1 | Status: SHIPPED | OUTPATIENT
Start: 2021-08-11 | End: 2022-01-20 | Stop reason: SDUPTHER

## 2021-08-11 RX ORDER — ALBUTEROL SULFATE 90 UG/1
2 AEROSOL, METERED RESPIRATORY (INHALATION) EVERY 4 HOURS PRN
Qty: 8.5 G | Refills: 2 | Status: SHIPPED | OUTPATIENT
Start: 2021-08-11

## 2021-08-11 RX ORDER — FLUTICASONE PROPIONATE 50 MCG
2 SPRAY, SUSPENSION (ML) NASAL DAILY
Qty: 16 G | Refills: 1 | Status: SHIPPED | OUTPATIENT
Start: 2021-08-11

## 2021-08-11 RX ORDER — CEFDINIR 300 MG/1
300 CAPSULE ORAL 2 TIMES DAILY
COMMUNITY
End: 2022-01-20

## 2021-08-11 RX ORDER — ERGOCALCIFEROL 1.25 MG/1
1 CAPSULE ORAL WEEKLY
Qty: 13 CAPSULE | Refills: 0 | Status: SHIPPED | OUTPATIENT
Start: 2021-08-11 | End: 2022-01-20 | Stop reason: SDUPTHER

## 2021-08-11 NOTE — PROGRESS NOTES
"Chief Complaint  Hospital Follow Up Visit    Subjective          Adore Neves presents to [facility]  Adore Neves is a 61 y.o. female admitted to [facility] in [facility] [place]note 2[place] and  is seen for follow up.  Patient presents with: Shortness of breath and hypoxia  Hospital Follow Up Visit pulmonology  Patient admitted to Dr. Tommie Todd  Admitted on July 30, 2021  Discharged on August 3, 2021    Discharge summary is available.  Current outpatient and discharge medications have been reconciled for the patient.  Oxygen 2 L, prednisone 10 mg, and Omnicef 300 mg twice daily for 10 days  Reviewed by: HUSAM Brooks  She was admitted for the following reason(s): Hypoxia, hypercapnic, respiratory failure due to COPD  Pertinent secondary diagnoses include Pneumonia.  Procedures performed while hospitalized:Procedures Performed in Hospital: IV fluids and IV Antibiotics with Levaquin  Pending results:  Pending tests: none  Pain Control:  well controlled  Diet: Regular diet  Activity after Discharge: activity as tolerated  Items to be addressed at first follow up visit include:  1. Medication changes:  Patient continues on all previous medications    She reports her overall condition is are improving since discharge.  She complains of fatigue and SOB with activity  Social support is said to be adequate to meet her needs.      Objective              08/11/21                    0955           BP:         136/82        BP Location:   Left arm       Patient Position:    Sitting       Cuff Size:     Adult        Pulse:        88          SpO2:         96%         Weight: 100 kg (221 lb)   Height: 162.6 cm (64\")     Body mass index is 37.93 kg/m².      Assessment/Plan : see note  Problem List Items Addressed This Visit  : See note    Is needing medication refills at this visit.          Objective   Vital Signs:   /82 " "(BP Location: Left arm, Patient Position: Sitting, Cuff Size: Adult)   Pulse 88   Ht 162.6 cm (64\")   Wt 100 kg (221 lb)   SpO2 96%   BMI 37.93 kg/m²     Physical Exam  Vitals reviewed.   Constitutional:       General: She is not in acute distress.     Appearance: Normal appearance. She is well-developed. She is obese. She is not toxic-appearing.   HENT:      Head: Normocephalic and atraumatic.   Eyes:      Conjunctiva/sclera: Conjunctivae normal.      Pupils: Pupils are equal, round, and reactive to light.   Cardiovascular:      Rate and Rhythm: Normal rate and regular rhythm.      Heart sounds: No murmur heard.   No friction rub. No gallop.    Pulmonary:      Effort: Pulmonary effort is normal.      Breath sounds: Decreased air movement (diffused bilateral throughout) present. Examination of the right-upper field reveals decreased breath sounds. Examination of the left-upper field reveals decreased breath sounds. Examination of the right-middle field reveals decreased breath sounds. Examination of the left-middle field reveals decreased breath sounds. Examination of the right-lower field reveals decreased breath sounds. Examination of the left-lower field reveals decreased breath sounds. Decreased breath sounds present. No wheezing or rhonchi.      Comments: 2 L 02 via NC  Abdominal:      General: Bowel sounds are normal. There is no distension.      Palpations: Abdomen is soft.      Tenderness: There is no abdominal tenderness.   Skin:     General: Skin is warm and dry.   Neurological:      Mental Status: She is alert and oriented to person, place, and time.   Psychiatric:         Mood and Affect: Mood and affect normal.         Behavior: Behavior normal.         Thought Content: Thought content normal.         Judgment: Judgment normal.        Result Review :   The following data was reviewed by: HUSAM Brooks on 08/11/2021:  CMP    CMP 3/26/21   Glucose 190 (A)   BUN 13   Creatinine 0.80 "   Sodium 140   Potassium 5.1   Chloride 104   Calcium 9.6   Albumin 3.8   Total Bilirubin 0.21   Alkaline Phosphatase 49 (A)   AST (SGOT) 13 (A)   ALT (SGPT) 15   (A) Abnormal value       Comments are available for some flowsheets but are not being displayed.           CBC w/diff    CBC w/Diff 3/26/21   WBC 8.34   RBC 4.86   Hemoglobin 14.7   Hematocrit 45.6   MCV 93.8   MCH 30.2   MCHC 32.2 (A)   RDW 12.3   Platelets 243   Neutrophil Rel % 67.0   Lymphocyte Rel % 21.1   Monocyte Rel % 9.0   Eosinophil Rel % 1.8   Basophil Rel % 0.7   (A) Abnormal value            Lipid Panel    Lipid Panel 3/26/21   Total Cholesterol 100 (A)   Triglycerides 80   HDL Cholesterol 38 (A)   VLDL Cholesterol 16   LDL Cholesterol  46 (A)   (A) Abnormal value       Comments are available for some flowsheets but are not being displayed.           TSH    TSH 3/26/21   TSH 0.805           Most Recent A1C    HGBA1C Most Recent 3/26/21   Hemoglobin A1C 9.5 (A)   (A) Abnormal value       Comments are available for some flowsheets but are not being displayed.               Data reviewed: Hospital notes           Assessment and Plan    Diagnoses and all orders for this visit:    1. Inpatient hospitalization within last 30 days (Primary)    2. Pneumonia due to infectious organism, unspecified laterality, unspecified part of lung  Comments:  Improving, states she is feeling somewhat better.  We will give her 2 weeks off work and refer over to pulmonology for further management.  Orders:  -     Ambulatory Referral to Pulmonology    3. COPD with exacerbation (CMS/[facility])  Comments:  Will give patient albuterol inhaler and DuoNeb to take at home.  Discussed return precautions.  Patient verbalized understanding.  Orders:  -     Ambulatory Referral to Pulmonology    4. Oxygen dependent  Comments:  Currently on 2 L of oxygen via nasal cannula, will send over to pulmonology for further management.  Orders:  -     Ambulatory Referral to Pulmonology    5.  Chronic pain syndrome  Comments:  Patient needs refill of her gabapentin.  Orders:  -     gabapentin (NEURONTIN) 600 MG tablet; Take 1 tablet by mouth 4 (Four) Times a Day for 90 days.  Dispense: 360 tablet; Refill: 1    6. Medication refill  Comments:  Will refill medication.    Other orders  -     fluconazole (Diflucan) 150 MG tablet; Take 1 tablet by mouth 1 (One) Time for 1 dose.  Dispense: 1 tablet; Refill: 0  -     ipratropium-albuterol (DUO-NEB) 0.5-2.5 mg/3 ml nebulizer; Take 3 mL by nebulization Every 4 (Four) Hours As Needed for Wheezing.  Dispense: 360 mL; Refill: 1  -     albuterol sulfate  (90 Base) MCG/ACT inhaler; Inhale 2 puffs Every 4 (Four) Hours As Needed for Wheezing.  Dispense: 8.5 g; Refill: 2  -     atorvastatin (LIPITOR) 80 MG tablet; Take 1 tablet by mouth Daily.  Dispense: 90 tablet; Refill: 1  -     fenofibrate 160 MG tablet; Take 1 tablet by mouth Daily.  Dispense: 90 tablet; Refill: 1  -     ergocalciferol (ERGOCALCIFEROL) 1.25 MG (05412 UT) capsule; Take 1 capsule by mouth 1 (One) Time Per Week.  Dispense: 13 capsule; Refill: 0  -     fexofenadine (ALLEGRA) 180 MG tablet; Take 1 tablet by mouth Daily.  Dispense: 90 tablet; Refill: 1  -     fluticasone (FLONASE) 50 MCG/ACT nasal spray; 2 sprays into the nostril(s) as directed by provider Daily.  Dispense: 16 g; Refill: 1  -     metFORMIN (GLUCOPHAGE) 1000 MG tablet; Take 1 tablet by mouth 2 (Two) Times a Day With Meals.  Dispense: 180 tablet; Refill: 1  -     metoprolol tartrate (LOPRESSOR) 25 MG tablet; Take 1 tablet by mouth 2 (Two) Times a Day for 90 days.  Dispense: 180 tablet; Refill: 1  -     multivitamin with minerals (MULTIVITAMIN ADULT PO); Take 1 tablet by mouth Daily.  Dispense: 90 tablet; Refill: 1        Follow Up   No follow-ups on file.  Patient was given instructions and counseling regarding her condition or for health maintenance advice. Please see specific information pulled into the AVS if appropriate.

## 2021-08-31 DIAGNOSIS — G89.4 CHRONIC PAIN SYNDROME: ICD-10-CM

## 2021-09-01 RX ORDER — GABAPENTIN 600 MG/1
TABLET ORAL
Qty: 120 TABLET | Refills: 0 | Status: SHIPPED | OUTPATIENT
Start: 2021-09-01 | End: 2022-01-20 | Stop reason: SDUPTHER

## 2021-09-09 ENCOUNTER — OFFICE VISIT (OUTPATIENT)
Dept: FAMILY MEDICINE CLINIC | Facility: CLINIC | Age: 61
End: 2021-09-09

## 2021-09-09 VITALS
HEART RATE: 75 BPM | DIASTOLIC BLOOD PRESSURE: 72 MMHG | OXYGEN SATURATION: 98 % | SYSTOLIC BLOOD PRESSURE: 122 MMHG | WEIGHT: 229.4 LBS | TEMPERATURE: 97.1 F | BODY MASS INDEX: 39.16 KG/M2 | HEIGHT: 64 IN

## 2021-09-09 DIAGNOSIS — Z76.0 MEDICATION REFILL: ICD-10-CM

## 2021-09-09 DIAGNOSIS — J44.9 COPD WITHOUT EXACERBATION (HCC): ICD-10-CM

## 2021-09-09 DIAGNOSIS — Z12.31 ENCOUNTER FOR SCREENING MAMMOGRAM FOR MALIGNANT NEOPLASM OF BREAST: ICD-10-CM

## 2021-09-09 DIAGNOSIS — Z87.891 FORMER SMOKER, STOPPED SMOKING IN DISTANT PAST: ICD-10-CM

## 2021-09-09 DIAGNOSIS — Z09 FOLLOW UP: Primary | ICD-10-CM

## 2021-09-09 PROCEDURE — 99214 OFFICE O/P EST MOD 30 MIN: CPT | Performed by: NURSE PRACTITIONER

## 2021-09-09 RX ORDER — TRAMADOL HYDROCHLORIDE 50 MG/1
100 TABLET ORAL EVERY 6 HOURS PRN
Qty: 120 TABLET | Refills: 2 | Status: SHIPPED | OUTPATIENT
Start: 2021-09-09 | End: 2021-12-02 | Stop reason: SDUPTHER

## 2021-09-09 NOTE — PROGRESS NOTES
Chief Complaint  Follow-up, COPD, Nicotine Dependence, and Med Refill    Subjective        Social History     Socioeconomic History   • Marital status:      Spouse name: Not on file   • Number of children: Not on file   • Years of education: Not on file   • Highest education level: Not on file   Tobacco Use   • Smoking status: Former Smoker     Packs/day: 0.50     Years: 40.00     Pack years: 20.00     Types: Cigarettes     Start date:      Quit date: 2021     Years since quittin.0   • Smokeless tobacco: Never Used   • Tobacco comment: SOME SECOND HAND SMOKE EXPOSURE   Vaping Use   • Vaping Use: Never used   Substance and Sexual Activity   • Alcohol use: Defer   • Drug use: Never   • Sexual activity: Defer     Past Medical History:   Diagnosis Date   • Chronic pain    • COPD (chronic obstructive pulmonary disease) (CMS/HCC)    • Diabetes mellitus (CMS/[facility])    • Hyperlipidemia    • Hypertension    • Vitamin D deficiency      Past Surgical History:   Procedure Laterality Date   • ABDOMINAL HYSTERECTOMY     • LAPAROSCOPIC CHOLECYSTECTOMY     • REPLACEMENT TOTAL KNEE BILATERAL     • TONSILLECTOMY         [place] [facility]  Patient is a 1 month follow-up after being hospitalized for COPD.  When I seen patient right after she was discharged from the hospital she was still oxygen dependent still appeared ill-appearing.  She was improving with her symptoms but I wanted to follow-up with her in 1 month to make sure her symptoms had resolved and that she was feeling much better.  Patient had quit smoking while she was in the hospital so we will going to follow back up today to see how her smoking sensation is going.      Patient also needing medication refills for her tramadol.  She has chronic pain she has been controlled on tramadol for many years Ole and UDS are appropriate.      Patient would also like a mammogram ordered today.  Patient  "states that she is due for her mammogram and has been over a year.  She would like to order to be sent down to [facility] where she typically gets her mammograms done.  No previous abnormal mammogram in the past.    COPD  She complains of cough, shortness of breath and sputum production. Primary symptoms comments: Symptoms have resolved. The problem occurs rarely. The problem has been resolved. Her symptoms are alleviated by steroid inhaler. Risk factors for lung disease include smoking/tobacco exposure. Her past medical history is significant for COPD.       Objective   Vital Signs:   /72   Pulse 75   Temp 97.1 °F (36.2 °C)   Ht 162.6 cm (64.02\")   Wt 104 kg (229 lb 6.4 oz)   SpO2 98%   BMI 39.36 kg/m²     Physical Exam  Vitals reviewed.   Constitutional:       Appearance: Normal appearance. She is well-developed. She is obese.   HENT:      Head: Normocephalic and atraumatic.      Right Ear: External ear normal.      Left Ear: External ear normal.   Eyes:      Conjunctiva/sclera: Conjunctivae normal.      Pupils: Pupils are equal, round, and reactive to light.   Cardiovascular:      Rate and Rhythm: Normal rate and regular rhythm.      Heart sounds: No murmur heard.   No friction rub.   Pulmonary:      Effort: Pulmonary effort is normal.      Breath sounds: Normal breath sounds. No wheezing or rhonchi.   Skin:     General: Skin is warm and dry.   Neurological:      Mental Status: She is alert and oriented to person, place, and time.   Psychiatric:         Mood and Affect: Mood and affect normal.         Behavior: Behavior normal.         Thought Content: Thought content normal.         Judgment: Judgment normal.        Result Review :   The following data was reviewed by: HUSAM Brooks on 09/09/2021:  Common labs    Common Labsle 3/26/21 3/26/21    1443 1443   Glucose  190 (A)   BUN  13   Creatinine  0.80   Sodium  140   Potassium  5.1   Chloride  104   Calcium  9.6 "   Albumin  3.8   Total Bilirubin  0.21   Alkaline Phosphatase  49 (A)   AST (SGOT)  13 (A)   ALT (SGPT)  15   WBC 8.34    Hemoglobin 14.7    Hematocrit 45.6    Platelets 243    Total Cholesterol  100 (A)   Triglycerides  80   HDL Cholesterol  38 (A)   LDL Cholesterol   46 (A)   Hemoglobin A1C  9.5 (A)   (A) Abnormal value       Comments are available for some flowsheets but are not being displayed.                      Current Outpatient Medications on File Prior to Visit   Medication Sig Dispense Refill   • albuterol sulfate  (90 Base) MCG/ACT inhaler Inhale 2 puffs Every 4 (Four) Hours As Needed for Wheezing. 8.5 g 2   • atorvastatin (LIPITOR) 80 MG tablet Take 1 tablet by mouth Daily. 90 tablet 1   • cefdinir (OMNICEF) 300 MG capsule Take 300 mg by mouth 2 (Two) Times a Day.     • ergocalciferol (ERGOCALCIFEROL) 1.25 MG (89531 UT) capsule Take 1 capsule by mouth 1 (One) Time Per Week. 13 capsule 0   • fenofibrate 160 MG tablet Take 1 tablet by mouth Daily. 90 tablet 1   • fexofenadine (ALLEGRA) 180 MG tablet Take 1 tablet by mouth Daily. 90 tablet 1   • fluticasone (FLONASE) 50 MCG/ACT nasal spray 2 sprays into the nostril(s) as directed by provider Daily. 16 g 1   • gabapentin (NEURONTIN) 600 MG tablet TAKE 1 TABLET BY MOUTH FOUR TIMES DAILY FOR 30 DAYS 120 tablet 0   • glyburide (DIAbeta) 5 MG tablet Take 1 tablet by mouth 2 (two) times a day.     • ipratropium-albuterol (DUO-NEB) 0.5-2.5 mg/3 ml nebulizer Take 3 mL by nebulization Every 4 (Four) Hours As Needed for Wheezing. 360 mL 1   • metFORMIN (GLUCOPHAGE) 1000 MG tablet Take 1 tablet by mouth 2 (Two) Times a Day With Meals. 180 tablet 1   • metoprolol tartrate (LOPRESSOR) 25 MG tablet Take 1 tablet by mouth 2 (Two) Times a Day for 90 days. 180 tablet 1   • multivitamin with minerals (MULTIVITAMIN ADULT PO) Take 1 tablet by mouth Daily. 90 tablet 1   • [DISCONTINUED] traMADol (ULTRAM) 50 MG tablet Take 2 tablets by mouth Every 6 (Six) Hours As  Needed for Moderate Pain . 120 tablet 1     No current facility-administered medications on file prior to visit.       Assessment and Plan    Diagnoses and all orders for this visit:    1. Follow up (Primary)  Comments:  Patient is feeling much sx have completely resolved.  Patient is now working full-time has for the last 3 weeks.  She is not using oxygen for the last 3 weeks      2. COPD without exacerbation (CMS/HCC)  Comments:  Doing much better, no longer needing oxygen.  Request increased to come and  oxygen from patient's home.  We'll continue to monitor patient's progress.    3. Medication refill  Comments:  Tramadol refilled at today's visit.  Ole and UDS appropriate.  Orders:  -     traMADol (ULTRAM) 50 MG tablet; Take 2 tablets by mouth Every 6 (Six) Hours As Needed for Moderate Pain .  Dispense: 120 tablet; Refill: 2    4. Encounter for screening mammogram for malignant neoplasm of breast  Comments:  Order placed for mammogram.  Orders:  -     Mammo Screening Digital Tomosynthesis Bilateral With CAD    5. Former smoker, stopped smoking in distant past  Comments:  Still not smoking, feeling well states she is feeling much better since she has quit smoking.  We'll continue to monitor patient's progress.        Follow Up   No follow-ups on file.  Patient was given instructions and counseling regarding her condition or for health maintenance advice. Please see specific information pulled into the AVS if appropriate.

## 2021-12-02 DIAGNOSIS — Z76.0 MEDICATION REFILL: ICD-10-CM

## 2021-12-02 RX ORDER — TRAMADOL HYDROCHLORIDE 50 MG/1
100 TABLET ORAL EVERY 6 HOURS PRN
Qty: 120 TABLET | Refills: 2 | Status: SHIPPED | OUTPATIENT
Start: 2021-12-02 | End: 2022-02-21 | Stop reason: SDUPTHER

## 2021-12-03 PROBLEM — M19.90 ARTHRITIS: Status: ACTIVE | Noted: 2021-12-03

## 2022-01-20 ENCOUNTER — OFFICE VISIT (OUTPATIENT)
Dept: FAMILY MEDICINE CLINIC | Facility: CLINIC | Age: 62
End: 2022-01-20

## 2022-01-20 VITALS
OXYGEN SATURATION: 97 % | HEART RATE: 84 BPM | DIASTOLIC BLOOD PRESSURE: 64 MMHG | HEIGHT: 64 IN | BODY MASS INDEX: 38.79 KG/M2 | SYSTOLIC BLOOD PRESSURE: 132 MMHG | TEMPERATURE: 97.2 F | WEIGHT: 227.2 LBS

## 2022-01-20 DIAGNOSIS — G89.4 CHRONIC PAIN SYNDROME: ICD-10-CM

## 2022-01-20 DIAGNOSIS — Z23 NEED FOR IMMUNIZATION AGAINST INFLUENZA: ICD-10-CM

## 2022-01-20 DIAGNOSIS — Z09 FOLLOW-UP EXAM, 3-6 MONTHS SINCE PREVIOUS EXAM: Primary | ICD-10-CM

## 2022-01-20 DIAGNOSIS — E78.2 MIXED HYPERLIPIDEMIA: ICD-10-CM

## 2022-01-20 DIAGNOSIS — E03.9 ACQUIRED HYPOTHYROIDISM: ICD-10-CM

## 2022-01-20 DIAGNOSIS — E11.9 TYPE 2 DIABETES MELLITUS WITHOUT COMPLICATION, WITHOUT LONG-TERM CURRENT USE OF INSULIN: ICD-10-CM

## 2022-01-20 DIAGNOSIS — I10 ESSENTIAL HYPERTENSION: ICD-10-CM

## 2022-01-20 DIAGNOSIS — E55.9 VITAMIN D DEFICIENCY: ICD-10-CM

## 2022-01-20 LAB
25(OH)D3 SERPL-MCNC: 54 NG/ML
ALBUMIN SERPL-MCNC: 4.5 G/DL (ref 3.5–5.2)
ALBUMIN/GLOB SERPL: 2 G/DL
ALP SERPL-CCNC: 56 U/L (ref 39–117)
ALT SERPL W P-5'-P-CCNC: 19 U/L (ref 1–33)
ANION GAP SERPL CALCULATED.3IONS-SCNC: 9.5 MMOL/L (ref 5–15)
AST SERPL-CCNC: 14 U/L (ref 1–32)
BASOPHILS # BLD AUTO: 0.07 10*3/MM3 (ref 0–0.2)
BASOPHILS NFR BLD AUTO: 0.7 % (ref 0–1.5)
BILIRUB SERPL-MCNC: 0.3 MG/DL (ref 0–1.2)
BUN SERPL-MCNC: 16 MG/DL (ref 8–23)
BUN/CREAT SERPL: 13.7 (ref 7–25)
CALCIUM SPEC-SCNC: 9.7 MG/DL (ref 8.6–10.5)
CHLORIDE SERPL-SCNC: 102 MMOL/L (ref 98–107)
CHOLEST SERPL-MCNC: 114 MG/DL (ref 0–200)
CO2 SERPL-SCNC: 28.5 MMOL/L (ref 22–29)
CREAT SERPL-MCNC: 1.17 MG/DL (ref 0.57–1)
DEPRECATED RDW RBC AUTO: 42 FL (ref 37–54)
EOSINOPHIL # BLD AUTO: 0.17 10*3/MM3 (ref 0–0.4)
EOSINOPHIL NFR BLD AUTO: 1.8 % (ref 0.3–6.2)
ERYTHROCYTE [DISTWIDTH] IN BLOOD BY AUTOMATED COUNT: 12.4 % (ref 12.3–15.4)
GFR SERPL CREATININE-BSD FRML MDRD: 47 ML/MIN/1.73
GLOBULIN UR ELPH-MCNC: 2.3 GM/DL
GLUCOSE SERPL-MCNC: 182 MG/DL (ref 65–99)
HBA1C MFR BLD: 9.87 % (ref 4.8–5.6)
HCT VFR BLD AUTO: 48.9 % (ref 34–46.6)
HDLC SERPL-MCNC: 42 MG/DL (ref 40–60)
HGB BLD-MCNC: 16.1 G/DL (ref 12–15.9)
IMM GRANULOCYTES # BLD AUTO: 0.02 10*3/MM3 (ref 0–0.05)
IMM GRANULOCYTES NFR BLD AUTO: 0.2 % (ref 0–0.5)
LDLC SERPL CALC-MCNC: 49 MG/DL (ref 0–100)
LDLC/HDLC SERPL: 1.1 {RATIO}
LYMPHOCYTES # BLD AUTO: 3.7 10*3/MM3 (ref 0.7–3.1)
LYMPHOCYTES NFR BLD AUTO: 38.3 % (ref 19.6–45.3)
MCH RBC QN AUTO: 30.4 PG (ref 26.6–33)
MCHC RBC AUTO-ENTMCNC: 32.9 G/DL (ref 31.5–35.7)
MCV RBC AUTO: 92.4 FL (ref 79–97)
MONOCYTES # BLD AUTO: 0.66 10*3/MM3 (ref 0.1–0.9)
MONOCYTES NFR BLD AUTO: 6.8 % (ref 5–12)
NEUTROPHILS NFR BLD AUTO: 5.04 10*3/MM3 (ref 1.7–7)
NEUTROPHILS NFR BLD AUTO: 52.2 % (ref 42.7–76)
NRBC BLD AUTO-RTO: 0 /100 WBC (ref 0–0.2)
PLATELET # BLD AUTO: 241 10*3/MM3 (ref 140–450)
PMV BLD AUTO: 12.1 FL (ref 6–12)
POTASSIUM SERPL-SCNC: 4.7 MMOL/L (ref 3.5–5.2)
PROT SERPL-MCNC: 6.8 G/DL (ref 6–8.5)
RBC # BLD AUTO: 5.29 10*6/MM3 (ref 3.77–5.28)
SODIUM SERPL-SCNC: 140 MMOL/L (ref 136–145)
T-UPTAKE NFR SERPL: 1.12 TBI (ref 0.8–1.3)
T4 SERPL-MCNC: 10.1 MCG/DL (ref 4.5–11.7)
TRIGL SERPL-MCNC: 130 MG/DL (ref 0–150)
TSH SERPL DL<=0.05 MIU/L-ACNC: 1.31 UIU/ML (ref 0.27–4.2)
VLDLC SERPL-MCNC: 23 MG/DL (ref 5–40)
WBC NRBC COR # BLD: 9.66 10*3/MM3 (ref 3.4–10.8)

## 2022-01-20 PROCEDURE — 36415 COLL VENOUS BLD VENIPUNCTURE: CPT | Performed by: NURSE PRACTITIONER

## 2022-01-20 PROCEDURE — 82306 VITAMIN D 25 HYDROXY: CPT | Performed by: NURSE PRACTITIONER

## 2022-01-20 PROCEDURE — 90686 IIV4 VACC NO PRSV 0.5 ML IM: CPT | Performed by: NURSE PRACTITIONER

## 2022-01-20 PROCEDURE — 83036 HEMOGLOBIN GLYCOSYLATED A1C: CPT | Performed by: NURSE PRACTITIONER

## 2022-01-20 PROCEDURE — 84436 ASSAY OF TOTAL THYROXINE: CPT | Performed by: NURSE PRACTITIONER

## 2022-01-20 PROCEDURE — 90471 IMMUNIZATION ADMIN: CPT | Performed by: NURSE PRACTITIONER

## 2022-01-20 PROCEDURE — 99214 OFFICE O/P EST MOD 30 MIN: CPT | Performed by: NURSE PRACTITIONER

## 2022-01-20 PROCEDURE — 80061 LIPID PANEL: CPT | Performed by: NURSE PRACTITIONER

## 2022-01-20 PROCEDURE — 80050 GENERAL HEALTH PANEL: CPT | Performed by: NURSE PRACTITIONER

## 2022-01-20 PROCEDURE — 84479 ASSAY OF THYROID (T3 OR T4): CPT | Performed by: NURSE PRACTITIONER

## 2022-01-20 RX ORDER — GLYBURIDE 5 MG/1
5 TABLET ORAL 2 TIMES DAILY WITH MEALS
Qty: 180 TABLET | Refills: 1 | Status: SHIPPED | OUTPATIENT
Start: 2022-01-20 | End: 2022-04-20 | Stop reason: SDUPTHER

## 2022-01-20 RX ORDER — ATORVASTATIN CALCIUM 80 MG/1
80 TABLET, FILM COATED ORAL DAILY
Qty: 90 TABLET | Refills: 1 | Status: SHIPPED | OUTPATIENT
Start: 2022-01-20 | End: 2022-04-20 | Stop reason: SDUPTHER

## 2022-01-20 RX ORDER — GABAPENTIN 600 MG/1
600 TABLET ORAL 4 TIMES DAILY
Qty: 360 TABLET | Refills: 1 | Status: SHIPPED | OUTPATIENT
Start: 2022-01-20 | End: 2022-04-20 | Stop reason: SDUPTHER

## 2022-01-20 RX ORDER — FENOFIBRATE 160 MG/1
160 TABLET ORAL DAILY
Qty: 90 TABLET | Refills: 1 | Status: SHIPPED | OUTPATIENT
Start: 2022-01-20 | End: 2022-01-20 | Stop reason: SDUPTHER

## 2022-01-20 RX ORDER — FENOFIBRATE 160 MG/1
160 TABLET ORAL DAILY
Qty: 90 TABLET | Refills: 1 | Status: SHIPPED | OUTPATIENT
Start: 2022-01-20 | End: 2022-04-20 | Stop reason: SDUPTHER

## 2022-01-20 RX ORDER — FEXOFENADINE HCL 180 MG/1
180 TABLET ORAL DAILY
Qty: 90 TABLET | Refills: 1 | Status: SHIPPED | OUTPATIENT
Start: 2022-01-20 | End: 2022-04-20 | Stop reason: SDUPTHER

## 2022-01-20 RX ORDER — GLYBURIDE 5 MG/1
5 TABLET ORAL 2 TIMES DAILY WITH MEALS
Qty: 180 TABLET | Refills: 1 | Status: SHIPPED | OUTPATIENT
Start: 2022-01-20 | End: 2022-01-20 | Stop reason: SDUPTHER

## 2022-01-20 RX ORDER — FEXOFENADINE HCL 180 MG/1
180 TABLET ORAL DAILY
Qty: 90 TABLET | Refills: 1 | Status: SHIPPED | OUTPATIENT
Start: 2022-01-20 | End: 2022-01-20 | Stop reason: SDUPTHER

## 2022-01-20 RX ORDER — ERGOCALCIFEROL 1.25 MG/1
1 CAPSULE ORAL WEEKLY
Qty: 13 CAPSULE | Refills: 0 | Status: SHIPPED | OUTPATIENT
Start: 2022-01-20 | End: 2022-01-20 | Stop reason: SDUPTHER

## 2022-01-20 RX ORDER — ATORVASTATIN CALCIUM 80 MG/1
80 TABLET, FILM COATED ORAL DAILY
Qty: 90 TABLET | Refills: 1 | Status: SHIPPED | OUTPATIENT
Start: 2022-01-20 | End: 2022-01-20 | Stop reason: SDUPTHER

## 2022-01-20 RX ORDER — ERGOCALCIFEROL 1.25 MG/1
1 CAPSULE ORAL WEEKLY
Qty: 13 CAPSULE | Refills: 0 | Status: SHIPPED | OUTPATIENT
Start: 2022-01-20 | End: 2022-04-20 | Stop reason: SDUPTHER

## 2022-01-20 RX ORDER — GABAPENTIN 600 MG/1
600 TABLET ORAL 4 TIMES DAILY
Qty: 360 TABLET | Refills: 1 | Status: SHIPPED | OUTPATIENT
Start: 2022-01-20 | End: 2022-01-20 | Stop reason: SDUPTHER

## 2022-01-20 NOTE — PROGRESS NOTES
Chief Complaint  Med Refill, Diabetes, Hyperlipidemia, and Hypertension    Subjective          Medical History: has a past medical history of Chronic pain, COPD (chronic obstructive pulmonary disease) (HCC), Diabetes mellitus (HCC), Hyperlipidemia, Hypertension, and Vitamin D deficiency.     Surgical History: has a past surgical history that includes Abdominal hysterectomy; Replacement total knee bilateral; Laparoscopic cholecystectomy; and Tonsillectomy.     Family History: family history includes Diabetes in her brother; Heart disease in her father, mother, and another family member; Nephrolithiasis in her brother; Other in her brother.     Social History: reports that she quit smoking about 4 months ago. Her smoking use included cigarettes. She started smoking about 42 years ago. She has a 20.00 pack-year smoking history. She has never used smokeless tobacco. Alcohol use questions deferred to the physician. She reports that she does not use drugs.    Adore Neves presents to [facility]  Diabetes  She presents for her follow-up diabetic visit. She has type 2 diabetes mellitus. Her disease course has been stable. There are no hypoglycemic associated symptoms. There are no diabetic associated symptoms. Pertinent negatives for diabetes include no fatigue and no visual change. There are no hypoglycemic complications. Diabetic complications include peripheral neuropathy. Risk factors for coronary artery disease include diabetes mellitus, dyslipidemia, obesity, post-menopausal, tobacco exposure and hypertension. Current diabetic treatment includes oral agent (dual therapy). She is compliant with treatment all of the time. She is following a generally unhealthy diet. When asked about meal planning, she reported none. She has not had a previous visit with a dietitian. Her breakfast blood glucose range is generally 130-140 mg/dl. An ACE inhibitor/angiotensin II receptor blocker is  "being taken. She does not see a podiatrist.Eye exam is not current.   Hyperlipidemia  This is a chronic problem. The current episode started more than 1 year ago. The problem is controlled. Exacerbating diseases include diabetes, hypothyroidism and obesity. Factors aggravating her hyperlipidemia include fatty foods and smoking. Pertinent negatives include no myalgias. Current antihyperlipidemic treatment includes statins. The current treatment provides significant improvement of lipids. Compliance problems include adherence to diet.  Risk factors for coronary artery disease include diabetes mellitus, dyslipidemia, hypertension, obesity and post-menopausal.   Hypertension  This is a chronic problem. The current episode started more than 1 year ago. The problem is controlled. Pertinent negatives include no anxiety or peripheral edema. Risk factors for coronary artery disease include diabetes mellitus, dyslipidemia, obesity, post-menopausal state and smoking/tobacco exposure. Past treatments include beta blockers. Current antihypertension treatment includes beta blockers. The current treatment provides moderate improvement. There are no compliance problems.  Identifiable causes of hypertension include a thyroid problem.   Hypothyroidism  This is a chronic problem. The current episode started more than 1 year ago. The problem has been unchanged. Pertinent negatives include no abdominal pain, anorexia, fatigue, joint swelling, myalgias or visual change. Treatments tried: Levothyroxine. The treatment provided significant relief.       Objective   Vital Signs:   /64   Pulse 84   Temp 97.2 °F (36.2 °C)   Ht 162.6 cm (64\")   Wt 103 kg (227 lb 3.2 oz)   SpO2 97%   BMI 39.00 kg/m²     Physical Exam  Vitals reviewed.   Constitutional:       Appearance: Normal appearance. She is well-developed. She is obese.   HENT:      Head: Normocephalic and atraumatic.      Right Ear: Tympanic membrane and external ear normal.    "   Left Ear: Tympanic membrane and external ear normal.   Eyes:      Conjunctiva/sclera: Conjunctivae normal.      Pupils: Pupils are equal, round, and reactive to light.   Cardiovascular:      Rate and Rhythm: Normal rate and regular rhythm.      Heart sounds: No murmur heard.      Pulmonary:      Effort: Pulmonary effort is normal.      Breath sounds: Normal breath sounds. No wheezing or rhonchi.   Skin:     General: Skin is warm and dry.   Neurological:      Mental Status: She is alert and oriented to person, place, and time.   Psychiatric:         Mood and Affect: Mood and affect normal.         Behavior: Behavior normal.         Thought Content: Thought content normal.         Judgment: Judgment normal.        Result Review :   The following data was reviewed by: HUSAM Brooks on 01/20/2022:  Common labs    Common Labsle 3/26/21 3/26/21    1443 1443   Glucose  190 (A)   BUN  13   Creatinine  0.80   Sodium  140   Potassium  5.1   Chloride  104   Calcium  9.6   Albumin  3.8   Total Bilirubin  0.21   Alkaline Phosphatase  49 (A)   AST (SGOT)  13 (A)   ALT (SGPT)  15   WBC 8.34    Hemoglobin 14.7    Hematocrit 45.6    Platelets 243    Total Cholesterol  100 (A)   Triglycerides  80   HDL Cholesterol  38 (A)   LDL Cholesterol   46 (A)   Hemoglobin A1C  9.5 (A)   (A) Abnormal value       Comments are available for some flowsheets but are not being displayed.                       Current Outpatient Medications on File Prior to Visit   Medication Sig Dispense Refill   • albuterol sulfate  (90 Base) MCG/ACT inhaler Inhale 2 puffs Every 4 (Four) Hours As Needed for Wheezing. 8.5 g 2   • fluticasone (FLONASE) 50 MCG/ACT nasal spray 2 sprays into the nostril(s) as directed by provider Daily. 16 g 1   • ipratropium-albuterol (DUO-NEB) 0.5-2.5 mg/3 ml nebulizer Take 3 mL by nebulization Every 4 (Four) Hours As Needed for Wheezing. 360 mL 1   • multivitamin with minerals (MULTIVITAMIN ADULT PO) Take 1  tablet by mouth Daily. 90 tablet 1   • traMADol (ULTRAM) 50 MG tablet Take 2 tablets by mouth Every 6 (Six) Hours As Needed for Moderate Pain . 120 tablet 2   • [DISCONTINUED] atorvastatin (LIPITOR) 80 MG tablet Take 1 tablet by mouth Daily. 90 tablet 1   • [DISCONTINUED] ergocalciferol (ERGOCALCIFEROL) 1.25 MG (45492 UT) capsule Take 1 capsule by mouth 1 (One) Time Per Week. 13 capsule 0   • [DISCONTINUED] fenofibrate 160 MG tablet Take 1 tablet by mouth Daily. 90 tablet 1   • [DISCONTINUED] fexofenadine (ALLEGRA) 180 MG tablet Take 1 tablet by mouth Daily. 90 tablet 1   • [DISCONTINUED] gabapentin (NEURONTIN) 600 MG tablet TAKE 1 TABLET BY MOUTH FOUR TIMES DAILY FOR 30 DAYS 120 tablet 0   • [DISCONTINUED] glyburide (DIAbeta) 5 MG tablet Take 1 tablet by mouth 2 (two) times a day.     • [DISCONTINUED] metFORMIN (GLUCOPHAGE) 1000 MG tablet Take 1 tablet by mouth 2 (Two) Times a Day With Meals. 180 tablet 1   • [DISCONTINUED] cefdinir (OMNICEF) 300 MG capsule Take 300 mg by mouth 2 (Two) Times a Day.     • [DISCONTINUED] metoprolol tartrate (LOPRESSOR) 25 MG tablet Take 1 tablet by mouth 2 (Two) Times a Day for 90 days. 180 tablet 1     No current facility-administered medications on file prior to visit.        Assessment and Plan    Diagnoses and all orders for this visit:    1. Follow-up exam, 3-6 months since previous exam (Primary)  -     CBC Auto Differential  -     Comprehensive Metabolic Panel  -     Hemoglobin A1c  -     Lipid Panel    2. Type 2 diabetes mellitus without complication, without long-term current use of insulin ([facility])  Comments:  Last A1c was 9.4 we will recheck labs, if still remains elevated discussed with patient we will start on Trulicity once a week injectable.  It was discussed in office about side effects of Trulicity.  Patient was shown how to use the Trulicity pen with a test pen in office per myself.  Ample opportunity was given for patient to answer questions.  Patient verbalized  understanding.  Orders:  -     CBC Auto Differential  -     Comprehensive Metabolic Panel  -     Hemoglobin A1c  -     Lipid Panel    3. Mixed hyperlipidemia  -     CBC Auto Differential  -     Comprehensive Metabolic Panel  -     Hemoglobin A1c  -     Lipid Panel    4. Essential hypertension  -     CBC Auto Differential  -     Comprehensive Metabolic Panel  -     Hemoglobin A1c  -     Lipid Panel    5. Vitamin D deficiency  Comments:  We will recheck, continue with medication if needed  Orders:  -     Vitamin D 25 Hydroxy    6. Chronic pain syndrome  Comments:  Patient needs refill of her gabapentin.  Orders:  -     Discontinue: gabapentin (NEURONTIN) 600 MG tablet; Take 1 tablet by mouth 4 (Four) Times a Day for 90 days.  Dispense: 360 tablet; Refill: 1  -     gabapentin (NEURONTIN) 600 MG tablet; Take 1 tablet by mouth 4 (Four) Times a Day for 90 days.  Dispense: 360 tablet; Refill: 1    7. Acquired hypothyroidism  Comments:  We will recheck, adjust medication if needed  Orders:  -     Thyroid Panel With TSH    8. Need for immunization against influenza  -     Flulaval/Fluarix/Fluzone >6 Months ([id number])    Other orders  -     Discontinue: atorvastatin (LIPITOR) 80 MG tablet; Take 1 tablet by mouth Daily.  Dispense: 90 tablet; Refill: 1  -     Discontinue: ergocalciferol (ERGOCALCIFEROL) 1.25 MG (52420 UT) capsule; Take 1 capsule by mouth 1 (One) Time Per Week.  Dispense: 13 capsule; Refill: 0  -     Discontinue: fenofibrate 160 MG tablet; Take 1 tablet by mouth Daily.  Dispense: 90 tablet; Refill: 1  -     Discontinue: fexofenadine (ALLEGRA) 180 MG tablet; Take 1 tablet by mouth Daily.  Dispense: 90 tablet; Refill: 1  -     Discontinue: glyburide (DIAbeta) 5 MG tablet; Take 1 tablet by mouth 2 (Two) Times a Day With Meals.  Dispense: 180 tablet; Refill: 1  -     Discontinue: metFORMIN (GLUCOPHAGE) 1000 MG tablet; Take 1 tablet by mouth 2 (Two) Times a Day With Meals.  Dispense: 180 tablet; Refill: 1  -      Discontinue: metoprolol tartrate (LOPRESSOR) 25 MG tablet; Take 1 tablet by mouth 2 (Two) Times a Day for 90 days.  Dispense: 180 tablet; Refill: 1  -     atorvastatin (LIPITOR) 80 MG tablet; Take 1 tablet by mouth Daily.  Dispense: 90 tablet; Refill: 1  -     fenofibrate 160 MG tablet; Take 1 tablet by mouth Daily.  Dispense: 90 tablet; Refill: 1  -     ergocalciferol (ERGOCALCIFEROL) 1.25 MG (29459 UT) capsule; Take 1 capsule by mouth 1 (One) Time Per Week.  Dispense: 13 capsule; Refill: 0  -     fexofenadine (ALLEGRA) 180 MG tablet; Take 1 tablet by mouth Daily.  Dispense: 90 tablet; Refill: 1  -     glyburide (DIAbeta) 5 MG tablet; Take 1 tablet by mouth 2 (Two) Times a Day With Meals.  Dispense: 180 tablet; Refill: 1  -     metFORMIN (GLUCOPHAGE) 1000 MG tablet; Take 1 tablet by mouth 2 (Two) Times a Day With Meals.  Dispense: 180 tablet; Refill: 1  -     metoprolol tartrate (LOPRESSOR) 25 MG tablet; Take 1 tablet by mouth 2 (Two) Times a Day for 90 days.  Dispense: 180 tablet; Refill: 1        Follow Up   Return in about 3 months (around 4/20/2022) for Recheck.  Patient was given instructions and counseling regarding her condition or for health maintenance advice. Please see specific information pulled into the AVS if appropriate.

## 2022-01-21 RX ORDER — DULAGLUTIDE 0.75 MG/.5ML
0.75 INJECTION, SOLUTION SUBCUTANEOUS WEEKLY
Qty: 4 PEN | Refills: 1 | Status: SHIPPED | OUTPATIENT
Start: 2022-01-21 | End: 2022-03-14

## 2022-02-21 DIAGNOSIS — Z76.0 MEDICATION REFILL: ICD-10-CM

## 2022-02-21 RX ORDER — TRAMADOL HYDROCHLORIDE 50 MG/1
100 TABLET ORAL EVERY 6 HOURS PRN
Qty: 120 TABLET | Refills: 2 | Status: SHIPPED | OUTPATIENT
Start: 2022-02-21 | End: 2022-04-20 | Stop reason: SDUPTHER

## 2022-03-14 RX ORDER — DULAGLUTIDE 0.75 MG/.5ML
INJECTION, SOLUTION SUBCUTANEOUS
Qty: 2 ML | Refills: 0 | Status: SHIPPED | OUTPATIENT
Start: 2022-03-14 | End: 2022-03-24 | Stop reason: SDUPTHER

## 2022-03-24 RX ORDER — DULAGLUTIDE 0.75 MG/.5ML
0.75 INJECTION, SOLUTION SUBCUTANEOUS WEEKLY
Qty: 2 ML | Refills: 0 | Status: SHIPPED | OUTPATIENT
Start: 2022-03-24 | End: 2022-04-20 | Stop reason: SDUPTHER

## 2022-03-24 NOTE — TELEPHONE ENCOUNTER
Caller: Adore Neves    Relationship: Self    Best call back number: [phone]    Requested Prescriptions:   Requested Prescriptions     Pending Prescriptions Disp Refills   • Dulaglutide (Trulicity) 0.75 MG/0.5ML solution pen-injector 2 mL 0        Pharmacy where request should be sent: [facility] - [place] [address] [address] [facility] - [phone]  - [phone] FX     Additional details provided by patient: PATIENT STATED SHE TOOK LAST DOSE TODAY     Does the patient have less than a 3 day supply:  [x] Yes  [] No    Silas BUENO Rep   03/24/22 09:13 EDT

## 2022-04-19 NOTE — PROGRESS NOTES
Chief Complaint  Diabetes    Subjective          Medical History: has a past medical history of Chronic pain, COPD (chronic obstructive pulmonary disease) (HCC), Diabetes mellitus (HCC), Hyperlipidemia, Hypertension, and Vitamin D deficiency.     Surgical History: has a past surgical history that includes Abdominal hysterectomy; Replacement total knee bilateral; Laparoscopic cholecystectomy; and Tonsillectomy.     Family History: family history includes Diabetes in her brother; Heart disease in her father, mother, and another family member; Nephrolithiasis in her brother; Other in her brother.     Social History: reports that she quit smoking about 7 months ago. Her smoking use included cigarettes. She started smoking about 42 years ago. She has a 20.00 pack-year smoking history. She has never used smokeless tobacco. Alcohol use questions deferred to the physician. She reports that she does not use drugs.    Adore Neves presents to [facility]  This is a chronic problem.  Current episode has been for longer than 6 months.  Problem has gradually been improving since she has been on vitamin D supplements.  She denies any excessive fatigue, hair loss, skin changes due to the vitamin D deficiency.  She tries to eat a well-balanced diet.  She has been on the vitamin D weekly, no compliance problems.  Condition is stable.      Diabetes  She presents for her follow-up diabetic visit. She has type 2 diabetes mellitus. Her disease course has been stable. There are no hypoglycemic associated symptoms. There are no diabetic associated symptoms. There are no hypoglycemic complications. Risk factors for coronary artery disease include diabetes mellitus, dyslipidemia, obesity, post-menopausal and hypertension. Current diabetic treatment includes oral agent (triple therapy). She is compliant with treatment all of the time. She is following a generally healthy diet. When asked about meal  "planning, she reported none. An ACE inhibitor/angiotensin II receptor blocker is being taken.   Hypertension  This is a chronic problem. The current episode started more than 1 year ago. Pertinent negatives include no anxiety, peripheral edema or shortness of breath. Risk factors for coronary artery disease include dyslipidemia, diabetes mellitus, post-menopausal state and obesity. Past treatments include beta blockers. Current antihypertension treatment includes beta blockers. The current treatment provides significant improvement. There are no compliance problems.    Hyperlipidemia  This is a chronic problem. The current episode started more than 1 year ago. Exacerbating diseases include diabetes and obesity. Pertinent negatives include no shortness of breath. Current antihyperlipidemic treatment includes statins. The current treatment provides moderate improvement of lipids. Risk factors for coronary artery disease include diabetes mellitus, family history, dyslipidemia, hypertension, obesity and post-menopausal.       Objective   Vital Signs:   /58   Pulse 79   Temp 96.7 °F (35.9 °C)   Ht 162.6 cm (64\")   Wt 100 kg (221 lb)   SpO2 100%   BMI 37.93 kg/m²     Physical Exam  Vitals reviewed.   Constitutional:       Appearance: Normal appearance. She is well-developed. She is obese.   HENT:      Head: Normocephalic and atraumatic.      Right Ear: Tympanic membrane and external ear normal.      Left Ear: Tympanic membrane and external ear normal.   Eyes:      Conjunctiva/sclera: Conjunctivae normal.      Pupils: Pupils are equal, round, and reactive to light.   Cardiovascular:      Rate and Rhythm: Normal rate and regular rhythm.      Heart sounds: No murmur heard.    No friction rub.   Pulmonary:      Effort: Pulmonary effort is normal.      Breath sounds: Normal breath sounds. No wheezing or rhonchi.   Musculoskeletal:      Right lower leg: No edema.      Left lower leg: No edema.   Skin:     General: " Skin is warm and dry.   Neurological:      Mental Status: She is alert and oriented to person, place, and time.   Psychiatric:         Mood and Affect: Mood and affect normal.         Behavior: Behavior normal.         Thought Content: Thought content normal.         Judgment: Judgment normal.        Result Review :   The following data was reviewed by: HUSAM Brooks on 04/20/2022:  Common labs    Common Labsle 1/20/22 1/20/22 1/20/22 1/20/22    0820 0820 0820 0820   Glucose   182 (A)    BUN   16    Creatinine   1.17 (A)    eGFR Non African Am   47 (A)    Sodium   140    Potassium   4.7    Chloride   102    Calcium   9.7    Albumin   4.50    Total Bilirubin   0.3    Alkaline Phosphatase   56    AST (SGOT)   14    ALT (SGPT)   19    WBC 9.66      Hemoglobin 16.1 (A)      Hematocrit 48.9 (A)      Platelets 241      Total Cholesterol    114   Triglycerides    130   HDL Cholesterol    42   LDL Cholesterol     49   Hemoglobin A1C  9.87 (A)     (A) Abnormal value                        Current Outpatient Medications on File Prior to Visit   Medication Sig Dispense Refill   • albuterol sulfate  (90 Base) MCG/ACT inhaler Inhale 2 puffs Every 4 (Four) Hours As Needed for Wheezing. 8.5 g 2   • fluticasone (FLONASE) 50 MCG/ACT nasal spray 2 sprays into the nostril(s) as directed by provider Daily. 16 g 1   • ipratropium-albuterol (DUO-NEB) 0.5-2.5 mg/3 ml nebulizer Take 3 mL by nebulization Every 4 (Four) Hours As Needed for Wheezing. 360 mL 1   • [DISCONTINUED] atorvastatin (LIPITOR) 80 MG tablet Take 1 tablet by mouth Daily. 90 tablet 1   • [DISCONTINUED] Dulaglutide (Trulicity) 0.75 MG/0.5ML solution pen-injector Inject 0.75 mg under the skin into the appropriate area as directed 1 (One) Time Per Week. 2 mL 0   • [DISCONTINUED] ergocalciferol (ERGOCALCIFEROL) 1.25 MG (42434 UT) capsule Take 1 capsule by mouth 1 (One) Time Per Week. 13 capsule 0   • [DISCONTINUED] fenofibrate 160 MG tablet Take 1 tablet  by mouth Daily. 90 tablet 1   • [DISCONTINUED] fexofenadine (ALLEGRA) 180 MG tablet Take 1 tablet by mouth Daily. 90 tablet 1   • [DISCONTINUED] gabapentin (NEURONTIN) 600 MG tablet Take 1 tablet by mouth 4 (Four) Times a Day for 90 days. 360 tablet 1   • [DISCONTINUED] glyburide (DIAbeta) 5 MG tablet Take 1 tablet by mouth 2 (Two) Times a Day With Meals. 180 tablet 1   • [DISCONTINUED] metFORMIN (GLUCOPHAGE) 1000 MG tablet Take 1 tablet by mouth 2 (Two) Times a Day With Meals. 180 tablet 1   • [DISCONTINUED] metoprolol tartrate (LOPRESSOR) 25 MG tablet Take 1 tablet by mouth 2 (Two) Times a Day for 90 days. 180 tablet 1   • [DISCONTINUED] multivitamin with minerals (MULTIVITAMIN ADULT PO) Take 1 tablet by mouth Daily. 90 tablet 1   • [DISCONTINUED] traMADol (ULTRAM) 50 MG tablet Take 2 tablets by mouth Every 6 (Six) Hours As Needed for Moderate Pain . 120 tablet 2     No current facility-administered medications on file prior to visit.        Assessment and Plan    Diagnoses and all orders for this visit:    1. Follow-up exam, 3-6 months since previous exam (Primary)    2. Type 2 diabetes mellitus without complication, without long-term current use of insulin (HCC)  Comments:  Patient was started on Trulicity at the last visit.  We will recheck labs if needed adjust the Trulicity.  Patient is agreeable treatment plan.    Orders:  -     CBC Auto Differential  -     Comprehensive Metabolic Panel  -     Hemoglobin A1c  -     Lipid Panel  -     MicroAlbumin, Urine, Random - Urine, Clean Catch  -     TSH    3. Essential hypertension  Comments:  Blood pressure stable at 110/58 we will continue to monitor and continue on current medication.    4. Mixed hyperlipidemia  Comments:  We will recheck labs, adjust medication if needed    5. Vitamin D deficiency  Comments:  Will recheck labs, adjust medication if needed  Orders:  -     Vitamin D 25 Hydroxy    6. Chronic pain syndrome  Comments:  Patient needs refill of her  gabapentin.  Orders:  -     gabapentin (NEURONTIN) 600 MG tablet; Take 1 tablet by mouth 4 (Four) Times a Day for 90 days.  Dispense: 360 tablet; Refill: 1  -     POC Urine Drug Screen [facility]    7. Medication refill  Comments:  Tramadol refilled at today's visit.  Ole and UDS appropriate.  Orders:  -     traMADol (ULTRAM) 50 MG tablet; Take 2 tablets by mouth Every 6 (Six) Hours As Needed for Moderate Pain .  Dispense: 120 tablet; Refill: 2    8. Encounter for screening mammogram for malignant neoplasm of breast  -     Mammo Screening Digital Tomosynthesis Bilateral With CAD    9. Class 2 drug-induced obesity with serious comorbidity and body mass index (BMI) of 37.0 to 37.9 in adult    Other orders  -     atorvastatin (LIPITOR) 80 MG tablet; Take 1 tablet by mouth Daily.  Dispense: 90 tablet; Refill: 1  -     Dulaglutide (Trulicity) 0.75 MG/0.5ML solution pen-injector; Inject 0.75 mg under the skin into the appropriate area as directed 1 (One) Time Per Week.  Dispense: 2 mL; Refill: 0  -     ergocalciferol (ERGOCALCIFEROL) 1.25 MG (59089 UT) capsule; Take 1 capsule by mouth 1 (One) Time Per Week.  Dispense: 13 capsule; Refill: 0  -     fenofibrate 160 MG tablet; Take 1 tablet by mouth Daily.  Dispense: 90 tablet; Refill: 1  -     glyburide (DIAbeta) 5 MG tablet; Take 1 tablet by mouth 2 (Two) Times a Day With Meals.  Dispense: 180 tablet; Refill: 1  -     metFORMIN (GLUCOPHAGE) 1000 MG tablet; Take 1 tablet by mouth 2 (Two) Times a Day With Meals.  Dispense: 180 tablet; Refill: 1  -     metoprolol tartrate (LOPRESSOR) 25 MG tablet; Take 1 tablet by mouth 2 (Two) Times a Day for 90 days.  Dispense: 180 tablet; Refill: 1  -     multivitamin with minerals (MULTIVITAMIN ADULT PO); Take 1 tablet by mouth Daily.  Dispense: 90 tablet; Refill: 1  -     fexofenadine (ALLEGRA) 180 MG tablet; Take 1 tablet by mouth Daily.  Dispense: 90 tablet; Refill: 1        Follow Up   No follow-ups on file.  Patient was given  instructions and counseling regarding her condition or for health maintenance advice. Please see specific information pulled into the AVS if appropriate.

## 2022-04-20 ENCOUNTER — OFFICE VISIT (OUTPATIENT)
Dept: FAMILY MEDICINE CLINIC | Facility: CLINIC | Age: 62
End: 2022-04-20

## 2022-04-20 VITALS
HEIGHT: 64 IN | TEMPERATURE: 96.7 F | BODY MASS INDEX: 37.73 KG/M2 | OXYGEN SATURATION: 100 % | HEART RATE: 79 BPM | SYSTOLIC BLOOD PRESSURE: 110 MMHG | DIASTOLIC BLOOD PRESSURE: 58 MMHG | WEIGHT: 221 LBS

## 2022-04-20 DIAGNOSIS — E66.1 CLASS 2 DRUG-INDUCED OBESITY WITH SERIOUS COMORBIDITY AND BODY MASS INDEX (BMI) OF 37.0 TO 37.9 IN ADULT: ICD-10-CM

## 2022-04-20 DIAGNOSIS — E78.2 MIXED HYPERLIPIDEMIA: ICD-10-CM

## 2022-04-20 DIAGNOSIS — E11.9 TYPE 2 DIABETES MELLITUS WITHOUT COMPLICATION, WITHOUT LONG-TERM CURRENT USE OF INSULIN: ICD-10-CM

## 2022-04-20 DIAGNOSIS — G89.4 CHRONIC PAIN SYNDROME: ICD-10-CM

## 2022-04-20 DIAGNOSIS — Z09 FOLLOW-UP EXAM, 3-6 MONTHS SINCE PREVIOUS EXAM: Primary | ICD-10-CM

## 2022-04-20 DIAGNOSIS — Z12.31 ENCOUNTER FOR SCREENING MAMMOGRAM FOR MALIGNANT NEOPLASM OF BREAST: ICD-10-CM

## 2022-04-20 DIAGNOSIS — I10 ESSENTIAL HYPERTENSION: ICD-10-CM

## 2022-04-20 DIAGNOSIS — E55.9 VITAMIN D DEFICIENCY: ICD-10-CM

## 2022-04-20 DIAGNOSIS — Z76.0 MEDICATION REFILL: ICD-10-CM

## 2022-04-20 LAB
25(OH)D3 SERPL-MCNC: 47 NG/ML (ref 30–100)
ALBUMIN SERPL-MCNC: 4.3 G/DL (ref 3.5–5.2)
ALBUMIN UR-MCNC: <1.2 MG/DL
ALBUMIN/GLOB SERPL: 1.5 G/DL
ALP SERPL-CCNC: 41 U/L (ref 39–117)
ALT SERPL W P-5'-P-CCNC: 17 U/L (ref 1–33)
AMPHET+METHAMPHET UR QL: NEGATIVE
AMPHETAMINE INTERNAL CONTROL: ABNORMAL
AMPHETAMINES UR QL: NEGATIVE
ANION GAP SERPL CALCULATED.3IONS-SCNC: 12.6 MMOL/L (ref 5–15)
AST SERPL-CCNC: 17 U/L (ref 1–32)
BARBITURATE INTERNAL CONTROL: ABNORMAL
BARBITURATES UR QL SCN: NEGATIVE
BASOPHILS # BLD AUTO: 0.07 10*3/MM3 (ref 0–0.2)
BASOPHILS NFR BLD AUTO: 0.7 % (ref 0–1.5)
BENZODIAZ UR QL SCN: NEGATIVE
BENZODIAZEPINE INTERNAL CONTROL: ABNORMAL
BILIRUB SERPL-MCNC: 0.2 MG/DL (ref 0–1.2)
BUN SERPL-MCNC: 20 MG/DL (ref 8–23)
BUN/CREAT SERPL: 24.4 (ref 7–25)
BUPRENORPHINE INTERNAL CONTROL: ABNORMAL
BUPRENORPHINE SERPL-MCNC: NEGATIVE NG/ML
CALCIUM SPEC-SCNC: 10 MG/DL (ref 8.6–10.5)
CANNABINOIDS SERPL QL: NEGATIVE
CHLORIDE SERPL-SCNC: 101 MMOL/L (ref 98–107)
CHOLEST SERPL-MCNC: 160 MG/DL (ref 0–200)
CO2 SERPL-SCNC: 27.4 MMOL/L (ref 22–29)
COCAINE INTERNAL CONTROL: ABNORMAL
COCAINE UR QL: NEGATIVE
CREAT SERPL-MCNC: 0.82 MG/DL (ref 0.57–1)
DEPRECATED RDW RBC AUTO: 40.5 FL (ref 37–54)
EGFRCR SERPLBLD CKD-EPI 2021: 82 ML/MIN/1.73
EOSINOPHIL # BLD AUTO: 0.31 10*3/MM3 (ref 0–0.4)
EOSINOPHIL NFR BLD AUTO: 3.2 % (ref 0.3–6.2)
ERYTHROCYTE [DISTWIDTH] IN BLOOD BY AUTOMATED COUNT: 12.7 % (ref 12.3–15.4)
EXPIRATION DATE: ABNORMAL
GLOBULIN UR ELPH-MCNC: 2.9 GM/DL
GLUCOSE SERPL-MCNC: 74 MG/DL (ref 65–99)
HBA1C MFR BLD: 7.6 % (ref 4.8–5.6)
HCT VFR BLD AUTO: 44.7 % (ref 34–46.6)
HDLC SERPL-MCNC: 41 MG/DL (ref 40–60)
HGB BLD-MCNC: 15.2 G/DL (ref 12–15.9)
IMM GRANULOCYTES # BLD AUTO: 0.04 10*3/MM3 (ref 0–0.05)
IMM GRANULOCYTES NFR BLD AUTO: 0.4 % (ref 0–0.5)
LDLC SERPL CALC-MCNC: 90 MG/DL (ref 0–100)
LDLC/HDLC SERPL: 2.07 {RATIO}
LYMPHOCYTES # BLD AUTO: 3.7 10*3/MM3 (ref 0.7–3.1)
LYMPHOCYTES NFR BLD AUTO: 38.4 % (ref 19.6–45.3)
Lab: ABNORMAL
MCH RBC QN AUTO: 30.4 PG (ref 26.6–33)
MCHC RBC AUTO-ENTMCNC: 34 G/DL (ref 31.5–35.7)
MCV RBC AUTO: 89.4 FL (ref 79–97)
MDMA (ECSTASY) INTERNAL CONTROL: ABNORMAL
MDMA UR QL SCN: NEGATIVE
METHADONE INTERNAL CONTROL: ABNORMAL
METHADONE UR QL SCN: NEGATIVE
METHAMPHETAMINE INTERNAL CONTROL: ABNORMAL
MONOCYTES # BLD AUTO: 0.63 10*3/MM3 (ref 0.1–0.9)
MONOCYTES NFR BLD AUTO: 6.5 % (ref 5–12)
NEUTROPHILS NFR BLD AUTO: 4.88 10*3/MM3 (ref 1.7–7)
NEUTROPHILS NFR BLD AUTO: 50.8 % (ref 42.7–76)
NRBC BLD AUTO-RTO: 0 /100 WBC (ref 0–0.2)
OPIATES INTERNAL CONTROL: ABNORMAL
OPIATES UR QL: NEGATIVE
OXYCODONE INTERNAL CONTROL: ABNORMAL
OXYCODONE UR QL SCN: NEGATIVE
PCP UR QL SCN: NEGATIVE
PHENCYCLIDINE INTERNAL CONTROL: ABNORMAL
PLATELET # BLD AUTO: 232 10*3/MM3 (ref 140–450)
PMV BLD AUTO: 11.2 FL (ref 6–12)
POTASSIUM SERPL-SCNC: 4.6 MMOL/L (ref 3.5–5.2)
PROT SERPL-MCNC: 7.2 G/DL (ref 6–8.5)
RBC # BLD AUTO: 5 10*6/MM3 (ref 3.77–5.28)
SODIUM SERPL-SCNC: 141 MMOL/L (ref 136–145)
THC INTERNAL CONTROL: ABNORMAL
TRIGL SERPL-MCNC: 170 MG/DL (ref 0–150)
TSH SERPL DL<=0.05 MIU/L-ACNC: 1.1 UIU/ML (ref 0.27–4.2)
VLDLC SERPL-MCNC: 29 MG/DL (ref 5–40)
WBC NRBC COR # BLD: 9.63 10*3/MM3 (ref 3.4–10.8)

## 2022-04-20 PROCEDURE — 99214 OFFICE O/P EST MOD 30 MIN: CPT | Performed by: NURSE PRACTITIONER

## 2022-04-20 PROCEDURE — 82043 UR ALBUMIN QUANTITATIVE: CPT | Performed by: NURSE PRACTITIONER

## 2022-04-20 PROCEDURE — 80305 DRUG TEST PRSMV DIR OPT OBS: CPT | Performed by: NURSE PRACTITIONER

## 2022-04-20 PROCEDURE — 80061 LIPID PANEL: CPT | Performed by: NURSE PRACTITIONER

## 2022-04-20 PROCEDURE — 82306 VITAMIN D 25 HYDROXY: CPT | Performed by: NURSE PRACTITIONER

## 2022-04-20 PROCEDURE — 83036 HEMOGLOBIN GLYCOSYLATED A1C: CPT | Performed by: NURSE PRACTITIONER

## 2022-04-20 PROCEDURE — 80050 GENERAL HEALTH PANEL: CPT | Performed by: NURSE PRACTITIONER

## 2022-04-20 RX ORDER — GABAPENTIN 600 MG/1
600 TABLET ORAL 4 TIMES DAILY
Qty: 360 TABLET | Refills: 1 | Status: SHIPPED | OUTPATIENT
Start: 2022-04-20 | End: 2022-05-23

## 2022-04-20 RX ORDER — GLYBURIDE 5 MG/1
5 TABLET ORAL 2 TIMES DAILY WITH MEALS
Qty: 180 TABLET | Refills: 1 | Status: SHIPPED | OUTPATIENT
Start: 2022-04-20 | End: 2023-01-18 | Stop reason: SDUPTHER

## 2022-04-20 RX ORDER — ERGOCALCIFEROL 1.25 MG/1
1 CAPSULE ORAL WEEKLY
Qty: 13 CAPSULE | Refills: 0 | Status: SHIPPED | OUTPATIENT
Start: 2022-04-20

## 2022-04-20 RX ORDER — ATORVASTATIN CALCIUM 80 MG/1
80 TABLET, FILM COATED ORAL DAILY
Qty: 90 TABLET | Refills: 1 | Status: SHIPPED | OUTPATIENT
Start: 2022-04-20 | End: 2023-01-18 | Stop reason: SDUPTHER

## 2022-04-20 RX ORDER — DULAGLUTIDE 0.75 MG/.5ML
0.75 INJECTION, SOLUTION SUBCUTANEOUS WEEKLY
Qty: 2 ML | Refills: 0 | Status: SHIPPED | OUTPATIENT
Start: 2022-04-20 | End: 2022-05-16

## 2022-04-20 RX ORDER — FEXOFENADINE HCL 180 MG/1
180 TABLET ORAL DAILY
Qty: 90 TABLET | Refills: 1 | Status: SHIPPED | OUTPATIENT
Start: 2022-04-20 | End: 2023-03-01

## 2022-04-20 RX ORDER — MULTIPLE VITAMINS W/ MINERALS TAB 9MG-400MCG
1 TAB ORAL DAILY
Qty: 90 TABLET | Refills: 1 | Status: SHIPPED | OUTPATIENT
Start: 2022-04-20

## 2022-04-20 RX ORDER — FENOFIBRATE 160 MG/1
160 TABLET ORAL DAILY
Qty: 90 TABLET | Refills: 1 | Status: SHIPPED | OUTPATIENT
Start: 2022-04-20

## 2022-04-20 RX ORDER — TRAMADOL HYDROCHLORIDE 50 MG/1
100 TABLET ORAL EVERY 6 HOURS PRN
Qty: 120 TABLET | Refills: 2 | Status: SHIPPED | OUTPATIENT
Start: 2022-04-20 | End: 2022-07-30 | Stop reason: SDUPTHER

## 2022-04-22 DIAGNOSIS — E11.9 TYPE 2 DIABETES MELLITUS WITHOUT COMPLICATION, WITHOUT LONG-TERM CURRENT USE OF INSULIN: Primary | ICD-10-CM

## 2022-04-22 RX ORDER — LANCETS 30 GAUGE
1 EACH MISCELLANEOUS 2 TIMES DAILY
COMMUNITY
End: 2022-04-22 | Stop reason: SDUPTHER

## 2022-04-22 RX ORDER — LANCETS 30 GAUGE
1 EACH MISCELLANEOUS 2 TIMES DAILY
Qty: 200 EACH | Refills: 1 | Status: SHIPPED | OUTPATIENT
Start: 2022-04-22 | End: 2022-04-28 | Stop reason: SDUPTHER

## 2022-04-26 ENCOUNTER — TELEPHONE (OUTPATIENT)
Dept: FAMILY MEDICINE CLINIC | Facility: CLINIC | Age: 62
End: 2022-04-26

## 2022-04-26 NOTE — TELEPHONE ENCOUNTER
Pharmacy is needing clarification on directions for  Lancets and test strips. Please call pharmacy - [facility] @ [phone]

## 2022-04-27 NOTE — TELEPHONE ENCOUNTER
Pharmacy Name:  [facility]    Pharmacy representative name: KIRIT     Pharmacy representative phone number: [phone]    What medication are you calling in regards to: ONE TOUCH LANCETS AND TEST STRIPS    Who is the provider that prescribed the medication:HUSAM LUIS    Additional notes: [facility]  IS NEEDING CLARIFICATION ON THE TEST STRIPS AND LANCETS. THEY NEED CLARIFICATION ON THE DIRECTIONS. THE PRESCRIPTION WILL BE PLACED ON HOLD UNTIL THEY RECEIVE CLARIFICATION.    REFERENCE #: [id number]

## 2022-04-28 DIAGNOSIS — E11.9 TYPE 2 DIABETES MELLITUS WITHOUT COMPLICATION, WITHOUT LONG-TERM CURRENT USE OF INSULIN: ICD-10-CM

## 2022-04-28 RX ORDER — LANCETS 30 GAUGE
1 EACH MISCELLANEOUS 2 TIMES DAILY PRN
Qty: 200 EACH | Refills: 2 | Status: SHIPPED | OUTPATIENT
Start: 2022-04-28

## 2022-04-28 NOTE — TELEPHONE ENCOUNTER
PHARMACY NEEDED NEW PRESCRIPTION TO SPECIFY HOW MANY TIMES NEWBERRY SHE NEEDS TO [facility] LANCETS AND WHAT BRAND SHE NEEDS. CANCELED OLD ORDER AND SENT IN NEW ORDER WITH 200 EACH AND 2 REFILLS EACH.

## 2022-05-02 ENCOUNTER — TELEPHONE (OUTPATIENT)
Dept: FAMILY MEDICINE CLINIC | Facility: CLINIC | Age: 62
End: 2022-05-02

## 2022-05-02 NOTE — TELEPHONE ENCOUNTER
[facility]     Pharmacy representative name: MELISSA     Pharmacy representative phone number: [phone]    What medication are you calling in regards to: ONE TOUCH LANCETS AND TEST STRIPS     What question does the pharmacy have:   THE DIRECTIONS ARE CONFLICTING AND NEEDS CLARIFICATION ON THE PRESCRIPTION     Who is the provider that prescribed the medication: CHARLEE LUIS     Additional notes: REFERENCE WHEN CALLING BACK IS [id number]

## 2022-05-16 RX ORDER — DULAGLUTIDE 0.75 MG/.5ML
INJECTION, SOLUTION SUBCUTANEOUS
Qty: 2 ML | Refills: 0 | Status: SHIPPED | OUTPATIENT
Start: 2022-05-16 | End: 2022-05-31

## 2022-05-23 DIAGNOSIS — G89.4 CHRONIC PAIN SYNDROME: ICD-10-CM

## 2022-05-23 RX ORDER — GABAPENTIN 600 MG/1
TABLET ORAL
Qty: 360 TABLET | Refills: 1 | Status: SHIPPED | OUTPATIENT
Start: 2022-05-23 | End: 2022-07-30 | Stop reason: SDUPTHER

## 2022-05-31 RX ORDER — DULAGLUTIDE 0.75 MG/.5ML
INJECTION, SOLUTION SUBCUTANEOUS
Qty: 2 ML | Refills: 0 | Status: SHIPPED | OUTPATIENT
Start: 2022-05-31 | End: 2022-10-19 | Stop reason: CLARIF

## 2022-07-30 DIAGNOSIS — Z76.0 MEDICATION REFILL: ICD-10-CM

## 2022-07-30 DIAGNOSIS — G89.4 CHRONIC PAIN SYNDROME: ICD-10-CM

## 2022-08-01 RX ORDER — TRAMADOL HYDROCHLORIDE 50 MG/1
100 TABLET ORAL EVERY 6 HOURS PRN
Qty: 120 TABLET | Refills: 0 | Status: SHIPPED | OUTPATIENT
Start: 2022-08-01 | End: 2022-09-08

## 2022-08-01 RX ORDER — GABAPENTIN 600 MG/1
600 TABLET ORAL 4 TIMES DAILY
Qty: 360 TABLET | Refills: 0 | Status: SHIPPED | OUTPATIENT
Start: 2022-08-01 | End: 2022-10-19 | Stop reason: SDUPTHER

## 2022-08-05 ENCOUNTER — HOSPITAL ENCOUNTER (EMERGENCY)
Facility: HOSPITAL | Age: 62
Discharge: HOME | End: 2022-08-05
Attending: EMERGENCY MEDICINE | Admitting: EMERGENCY MEDICINE

## 2022-08-05 ENCOUNTER — APPOINTMENT (OUTPATIENT)
Dept: GENERAL RADIOLOGY | Facility: HOSPITAL | Age: 62
End: 2022-08-05

## 2022-08-05 VITALS
HEIGHT: 64 IN | TEMPERATURE: 98.1 F | RESPIRATION RATE: 20 BRPM | WEIGHT: 200 LBS | BODY MASS INDEX: 34.15 KG/M2 | DIASTOLIC BLOOD PRESSURE: 84 MMHG | HEART RATE: 68 BPM | SYSTOLIC BLOOD PRESSURE: 147 MMHG | OXYGEN SATURATION: 95 %

## 2022-08-05 DIAGNOSIS — S43.015A ANTERIOR DISLOCATION OF LEFT SHOULDER, INITIAL ENCOUNTER: Primary | ICD-10-CM

## 2022-08-05 PROCEDURE — 73030 X-RAY EXAM OF SHOULDER: CPT

## 2022-08-05 PROCEDURE — 99284 EMERGENCY DEPT VISIT MOD MDM: CPT

## 2022-08-05 PROCEDURE — 25010000002 HYDROMORPHONE 1 MG/ML SOLUTION: Performed by: EMERGENCY MEDICINE

## 2022-08-05 PROCEDURE — 96374 THER/PROPH/DIAG INJ IV PUSH: CPT

## 2022-08-05 RX ORDER — ETOMIDATE 2 MG/ML
10 INJECTION INTRAVENOUS ONCE
Status: COMPLETED | OUTPATIENT
Start: 2022-08-05 | End: 2022-08-05

## 2022-08-05 RX ADMIN — HYDROMORPHONE HYDROCHLORIDE 1 MG: 1 INJECTION, SOLUTION INTRAMUSCULAR; INTRAVENOUS; SUBCUTANEOUS at 04:08

## 2022-08-05 RX ADMIN — ETOMIDATE 10 MG: 40 INJECTION, SOLUTION INTRAVENOUS at 04:48

## 2022-08-15 ENCOUNTER — OFFICE VISIT (OUTPATIENT)
Dept: ORTHOPEDIC SURGERY | Facility: CLINIC | Age: 62
End: 2022-08-15

## 2022-08-15 VITALS — BODY MASS INDEX: 37.39 KG/M2 | HEART RATE: 87 BPM | OXYGEN SATURATION: 94 % | WEIGHT: 219 LBS | HEIGHT: 64 IN

## 2022-08-15 DIAGNOSIS — S43.015A ANTERIOR DISLOCATION OF LEFT SHOULDER, INITIAL ENCOUNTER: Primary | ICD-10-CM

## 2022-08-15 PROCEDURE — 99203 OFFICE O/P NEW LOW 30 MIN: CPT | Performed by: STUDENT IN AN ORGANIZED HEALTH CARE EDUCATION/TRAINING PROGRAM

## 2022-08-15 RX ORDER — IBUPROFEN 600 MG/1
600 TABLET ORAL EVERY 6 HOURS PRN
COMMUNITY
Start: 2022-08-09 | End: 2023-01-18

## 2022-08-15 NOTE — PROGRESS NOTES
"Chief Complaint  Pain of the Left Shoulder    Subjective          Adore Neves presents to [facility] for   History of Present Illness    The patient presents here today for evaluation of the left shoulder. She dislocated her shoulder on 22 at work. She was seen at [facility] and her shoulder was reduced. She has been wearing a sling. She has been working in the sling. She has no other complaints.  She feels her recovery and in the early progress is going well so far.  She denies any numbness.  She denies any prior left shoulder trauma or pain.    No Known Allergies     Social History     Socioeconomic History   • Marital status:    Tobacco Use   • Smoking status: Former Smoker     Packs/day: 0.50     Years: 40.00     Pack years: 20.00     Types: Cigarettes     Start date:      Quit date: 2021     Years since quittin.9   • Smokeless tobacco: Never Used   • Tobacco comment: SOME SECOND HAND SMOKE EXPOSURE   Vaping Use   • Vaping Use: Never used   Substance and Sexual Activity   • Alcohol use: Defer   • Drug use: Never   • Sexual activity: Defer        I reviewed the patient's chief complaint, history of present illness, review of systems, past medical history, surgical history, family history, social history, medications, and allergy list.     REVIEW OF SYSTEMS    Constitutional: Denies fevers, chills, weight loss  Cardiovascular: Denies chest pain, shortness of breath  Skin: Denies rashes, acute skin changes  Neurologic: Denies headache, loss of consciousness  MSK: Left shoulder pain      Objective   Vital Signs:   Pulse 87   Ht 162.6 cm (64\")   Wt 99.3 kg (219 lb)   SpO2 94%   BMI 37.59 kg/m²     Body mass index is 37.59 kg/m².    Physical Exam    General: Alert. No acute distress.   Left shoulder- No skin discoloration, atrophy or swelling. resolving bruising to the chest wall. Axillary nerve intact. No pain with shoulder ROM at side. External Rotation 20. " Forward elevation 130. Full elbow flexion and extension. Full active finger ROM. Neurovascularly intact.     Procedures    Imaging Results (Most Recent)     None                   Assessment and Plan        XR Shoulder 2+ View Left    Result Date: 8/5/2022  Narrative: PROCEDURE: XR SHOULDER 2+ VW LEFT  COMPARISON: [facility], CR, XR SHOULDER 2+ VW LEFT, 8/05/2022, 3:49.  INDICATIONS: post reduction LEFT SHOULDER  FINDINGS:  Interval relocation of right shoulder.  No acute fracture or dislocation is identified.  Mild AC joint degenerative change.      Impression:   1. Interval relocation of right shoulder.      RAN KAUR MD       Electronically Signed and Approved By: RAN KAUR MD on 8/05/2022 at 5:16             XR Shoulder 2+ View Left    Result Date: 8/5/2022  Narrative: PROCEDURE: XR SHOULDER 2+ VW LEFT  COMPARISON: None  INDICATIONS: LEFT SHOULDER PAIN AFTER FALL INJURY  FINDINGS:  Anterior shoulder dislocation.  No definite humeral head fracture is identified.  No definite Bankart lesion.      Impression:   1. Anterior shoulder dislocation.      RAN KAUR MD       Electronically Signed and Approved By: RAN KAUR MD on 8/05/2022 at 3:54                Diagnoses and all orders for this visit:    1. Anterior dislocation of left shoulder, initial encounter (Primary)  -     Ambulatory Referral to Physical Therapy Evaluate and treat, Ortho; Stretching, ROM, Strengthening        Discussed the treatment plan with the patient.  I reviewed the x-rays that were obtained previously with the patient. She can continue to use the sling at work one arm duty. Order for physical therapy given today. Plan to not use sling at home.  We will monitor her progress with physical therapy.  We may order an MRI in the future if needed.      Will obtain X-Rays of Left shoulder at next visit.     Call or return if worsening symptoms.    Scribed for Chaz Bishop MD by Zoey Macrano  08/15/2022   09:31  EDT         Follow Up {Instructions Charge Capture  Follow-up Communications :23}  Return in about 2 weeks (around 8/29/2022).  Patient was given instructions and counseling regarding her condition or for health maintenance advice. Please see specific information pulled into the AVS if appropriate.       I have personally performed the services described in this document as scribed by the above individual and it is both accurate and complete.     Chaz Bishop MD  08/15/22  10:31 EDT

## 2022-08-31 ENCOUNTER — OFFICE VISIT (OUTPATIENT)
Dept: ORTHOPEDIC SURGERY | Facility: CLINIC | Age: 62
End: 2022-08-31

## 2022-08-31 VITALS — HEIGHT: 64 IN | HEART RATE: 92 BPM | BODY MASS INDEX: 38.07 KG/M2 | OXYGEN SATURATION: 98 % | WEIGHT: 223 LBS

## 2022-08-31 DIAGNOSIS — S43.015D ANTERIOR DISLOCATION OF LEFT SHOULDER, SUBSEQUENT ENCOUNTER: Primary | ICD-10-CM

## 2022-08-31 PROCEDURE — 99213 OFFICE O/P EST LOW 20 MIN: CPT | Performed by: STUDENT IN AN ORGANIZED HEALTH CARE EDUCATION/TRAINING PROGRAM

## 2022-08-31 NOTE — PROGRESS NOTES
"Chief Complaint  Follow-up and Pain of the Left Shoulder    Subjective          Adore Neves presents to [facility] for   History of Present Illness    The patient presents here today for follow up evaluation of the left shoulder. She dislocated her shoulder on 22 at work. She was seen at [facility] and her shoulder was reduced. She has physical therapy set up for this afternoon. She has weaned out of her sling. She is still having some pain. She has still been working with restrictions. She has no new complaints and is overall doing well.     No Known Allergies     Social History     Socioeconomic History   • Marital status:    Tobacco Use   • Smoking status: Former Smoker     Packs/day: 0.50     Years: 40.00     Pack years: 20.00     Types: Cigarettes     Start date:      Quit date: 2021     Years since quittin.9   • Smokeless tobacco: Never Used   • Tobacco comment: SOME SECOND HAND SMOKE EXPOSURE   Vaping Use   • Vaping Use: Never used   Substance and Sexual Activity   • Alcohol use: Defer   • Drug use: Never   • Sexual activity: Defer        I reviewed the patient's chief complaint, history of present illness, review of systems, past medical history, surgical history, family history, social history, medications, and allergy list.     REVIEW OF SYSTEMS    Constitutional: Denies fevers, chills, weight loss  Cardiovascular: Denies chest pain, shortness of breath  Skin: Denies rashes, acute skin changes  Neurologic: Denies headache, loss of consciousness  MSK: Left shoulder pain      Objective   Vital Signs:   Pulse 92   Ht 162.6 cm (64\")   Wt 101 kg (223 lb)   SpO2 98%   BMI 38.28 kg/m²     Body mass index is 38.28 kg/m².    Physical Exam    General: Alert. No acute distress.   Left shoulder- tender over the biceps. Forward elevation 130 with mild pain. External Rotation 10 with pain. Sensation intact to light touch median, radial, ulnar nerve. Positive AIN, " PIN, ulnar nerve motor function.  5/5 supraspinatus strength and infraspinatus  With no pain. 4/5 subscapularis with pain. Palpable radial pulse.     Procedures    Imaging Results (Most Recent)     Procedure Component Value Units Date/Time    XR Shoulder 2+ View Left [[id number]] Resulted: 08/31/22 1138     Updated: 08/31/22 1139    Narrative:      Indications: Follow-up left shoulder dislocation    Views: AP, Grashey, Scap Y left shoulder    Findings: No fractures noted.  Glenohumeral joint reduced.  AC joint and   glenohumeral arthritic changes are stable.    Comparative Data: Comparative data found and reviewed today                     Assessment and Plan        XR Shoulder 2+ View Left    Result Date: 8/31/2022  Narrative: Indications: Follow-up left shoulder dislocation Views: AP, Grashey, Scap Y left shoulder Findings: No fractures noted.  Glenohumeral joint reduced.  AC joint and glenohumeral arthritic changes are stable. Comparative Data: Comparative data found and reviewed today     XR Shoulder 2+ View Left    Result Date: 8/5/2022  Narrative: PROCEDURE: XR SHOULDER 2+ VW LEFT  COMPARISON: [facility], CR, XR SHOULDER 2+ VW LEFT, 8/05/2022, 3:49.  INDICATIONS: post reduction LEFT SHOULDER  FINDINGS:  Interval relocation of right shoulder.  No acute fracture or dislocation is identified.  Mild AC joint degenerative change.      Impression:   1. Interval relocation of right shoulder.      RAN KAUR MD       Electronically Signed and Approved By: RAN KAUR MD on 8/05/2022 at 5:16             XR Shoulder 2+ View Left    Result Date: 8/5/2022  Narrative: PROCEDURE: XR SHOULDER 2+ VW LEFT  COMPARISON: None  INDICATIONS: LEFT SHOULDER PAIN AFTER FALL INJURY  FINDINGS:  Anterior shoulder dislocation.  No definite humeral head fracture is identified.  No definite Bankart lesion.      Impression:   1. Anterior shoulder dislocation.      RAN KAUR MD       Electronically Signed and Approved  By: RAN KAUR MD on 8/05/2022 at 3:54                Diagnoses and all orders for this visit:    1. Anterior dislocation of left shoulder, subsequent encounter (Primary)  -     XR Shoulder 2+ View Left        Discussed the treatment plan with the patient.  I reviewed the x-rays that were obtained today with the patient. Plan to proceed with physical therapy. Work note given today with 5 pound weight restrictions.       No x-rays needed    Call or return if worsening symptoms.    Scribed for Chaz Bishop MD by Zoey Marcano  08/31/2022   08:06 EDT         Follow Up   Return in about 4 weeks (around 9/28/2022).  Patient was given instructions and counseling regarding her condition or for health maintenance advice. Please see specific information pulled into the AVS if appropriate.       I have personally performed the services described in this document as scribed by the above individual and it is both accurate and complete.     Chaz Bishop MD  08/31/22  12:57 EDT

## 2022-09-06 DIAGNOSIS — Z76.0 MEDICATION REFILL: ICD-10-CM

## 2022-09-07 NOTE — TELEPHONE ENCOUNTER
Rx Refill Note  Requested Prescriptions     Pending Prescriptions Disp Refills   • traMADol (ULTRAM) 50 MG tablet [Pharmacy Med Name: TRAMADOL 50MG TABLETS] 120 tablet      Sig: TAKE 2 TABLETS BY MOUTH EVERY 6 HOURS AS NEEDED FOR MODERATE PAIN      Last office visit with prescribing clinician: 4/20/2022      Next office visit with prescribing clinician: 9/28/2022       Last Relevant Lab Date: UDS  - 04/20/2022    PAIN CONTRACT - DUE    Refill Pharmacy: [facility] #[id number] [address] - [phone] [facility] [phone] FX     Dina Cotter MA  09/07/22, 07:43 EDT

## 2022-09-07 NOTE — ED PROVIDER NOTES
Subjective   PIT    Pt reports she slipped on the floor at work and tried to break the fall with her left arm. She felt a pop in her left shoulder and is unable to move her left arm.       History provided by:  Patient and EMS personnel  Fall  Mechanism of injury: fall    Injury location:  Shoulder/arm  Shoulder/arm injury location:  L shoulder  Incident location:  Work  Time since incident:  30 minutes  Arrived directly from scene: yes    Fall:     Fall occurred:  Standing    Impact surface:  Hard floor    Point of impact:  Outstretched arms  Associated symptoms: no abdominal pain, no back pain, no blindness, no chest pain, no difficulty breathing, no headaches, no hearing loss, no loss of consciousness, no nausea, no neck pain, no seizures and no vomiting        Review of Systems   Constitutional: Negative for chills and fever.   HENT: Negative for congestion, ear pain, hearing loss and sore throat.    Eyes: Negative for blindness and pain.   Respiratory: Negative for cough, chest tightness and shortness of breath.    Cardiovascular: Negative for chest pain.   Gastrointestinal: Negative for abdominal pain, diarrhea, nausea and vomiting.   Genitourinary: Negative for flank pain and hematuria.   Musculoskeletal: Positive for arthralgias. Negative for back pain, joint swelling and neck pain.   Skin: Negative for pallor.   Neurological: Negative for seizures, loss of consciousness and headaches.   All other systems reviewed and are negative.      Past Medical History:   Diagnosis Date   • Chronic pain    • COPD (chronic obstructive pulmonary disease) (HCC)    • Diabetes mellitus (HCC)    • Hyperlipidemia    • Hypertension    • Vitamin D deficiency        No Known Allergies    Past Surgical History:   Procedure Laterality Date   • ABDOMINAL HYSTERECTOMY     • LAPAROSCOPIC CHOLECYSTECTOMY     • REPLACEMENT TOTAL KNEE BILATERAL     • TONSILLECTOMY         Family History   Problem Relation Age of Onset   • Heart disease  Mother    • Heart disease Father    • Diabetes Brother    • Nephrolithiasis Brother    • Other Brother         BLADDER CALCULUS   • Heart disease Other        Social History     Socioeconomic History   • Marital status:    Tobacco Use   • Smoking status: Former Smoker     Packs/day: 0.50     Years: 40.00     Pack years: 20.00     Types: Cigarettes     Start date:      Quit date: 2021     Years since quittin.0   • Smokeless tobacco: Never Used   • Tobacco comment: SOME SECOND HAND SMOKE EXPOSURE   Vaping Use   • Vaping Use: Never used   Substance and Sexual Activity   • Alcohol use: Defer   • Drug use: Never   • Sexual activity: Defer           Objective   Physical Exam  Vitals and nursing note reviewed.   Constitutional:       General: She is not in acute distress.     Appearance: Normal appearance. She is not toxic-appearing.   HENT:      Head: Normocephalic and atraumatic.      Mouth/Throat:      Mouth: Mucous membranes are moist.   Eyes:      General: No scleral icterus.  Cardiovascular:      Rate and Rhythm: Normal rate and regular rhythm.      Pulses: Normal pulses.      Heart sounds: Normal heart sounds.   Pulmonary:      Effort: Pulmonary effort is normal. No respiratory distress.      Breath sounds: Normal breath sounds.   Abdominal:      General: Abdomen is flat.      Palpations: Abdomen is soft.      Tenderness: There is no abdominal tenderness.   Musculoskeletal:      Left shoulder: Deformity and tenderness present. Decreased range of motion. Normal pulse.      Left wrist: Normal pulse.        Arms:       Cervical back: Normal range of motion and neck supple.   Skin:     General: Skin is warm and dry.   Neurological:      Mental Status: She is alert and oriented to person, place, and time. Mental status is at baseline.         Procedures           ED Course                                           MDM  Number of Diagnoses or Management Options  Anterior dislocation of left shoulder,  initial encounter: new and requires workup  Diagnosis management comments: I have spoken with the patient. I have explained the patient´s condition, diagnoses and treatment plan based on the information available to me at this time. I have answered the patient's questions and addressed any concerns. The patient has a good  understanding of the patient´s diagnosis, condition, and treatment plan as can be expected at this point. The vital signs have been stable. The patient´s condition is stable and appropriate for discharge from the emergency department.      The patient will pursue further outpatient evaluation with the primary care physician or other designated or consulting physician as outlined in the discharge instructions. They are agreeable to this plan of care and follow-up instructions have been explained in detail. The patient has received these instructions in written format and have expressed an understanding of the discharge instructions. The patient is aware that any significant change in condition or worsening of symptoms should prompt an immediate return to this or the closest emergency department or call to 911.       Amount and/or Complexity of Data Reviewed  Tests in the radiology section of CPT®: reviewed    Risk of Complications, Morbidity, and/or Mortality  Presenting problems: moderate  Diagnostic procedures: moderate  Management options: moderate    Patient Progress  Patient progress: improved      Final diagnoses:   Anterior dislocation of left shoulder, initial encounter       ED Disposition  ED Disposition     ED Disposition   Discharge    Condition   Stable    Comment   --             Kiesha Evans, APRN  [address]  [address]  [facility] [address]  [phone]    In 3 days  As needed    [facility]  [address]  [facility] [address]  [phone]  Schedule an appointment as soon as possible for a visit            Medication List      No  changes were made to your prescriptions during this visit.          Ruben Felix, APRN  09/07/22 [id number]

## 2022-09-08 RX ORDER — TRAMADOL HYDROCHLORIDE 50 MG/1
TABLET ORAL
Qty: 120 TABLET | Refills: 1 | Status: SHIPPED | OUTPATIENT
Start: 2022-09-08 | End: 2022-10-19 | Stop reason: SDUPTHER

## 2022-09-30 ENCOUNTER — TELEPHONE (OUTPATIENT)
Dept: FAMILY MEDICINE CLINIC | Facility: CLINIC | Age: 62
End: 2022-09-30

## 2022-09-30 NOTE — TELEPHONE ENCOUNTER
Patient missed appointment on 09/28/2022 @ 0845 with Kiesha Evans. Called number in chart [phone]. No answer, left message. Second attempt, sending letter.

## 2022-09-30 NOTE — TELEPHONE ENCOUNTER
Patient missed appointment on 08/28/2022 @ 0845 with Kiesha Evans. Called first number in chart. [phone]. No answer, unable to leave message.

## 2022-10-04 NOTE — PROGRESS NOTES
"Chief Complaint  Pain and Follow-up of the Left Shoulder    Subjective          Adore Neves presents to [facility] for   History of Present Illness    Adore Neves presents today for a follow up of her left shoulder. Patient has a left shoulder anterior dislocation on 2022.  Today, patient states she is doing well.  She reports improvements in her range of motion.  She is continue with formal physical therapy as well as her home exercises.  She reports some pain to the posterior shoulder with motion.  She has not yet started any strengthening exercises.  She denies new injuries.  Denies numbness or tingling.  She is taking ibuprofen only on occasion for her shoulder.      No Known Allergies     Social History     Socioeconomic History   • Marital status:    Tobacco Use   • Smoking status: Former Smoker     Packs/day: 0.50     Years: 40.00     Pack years: 20.00     Types: Cigarettes     Start date:      Quit date: 2021     Years since quittin.0   • Smokeless tobacco: Never Used   • Tobacco comment: SOME SECOND HAND SMOKE EXPOSURE   Vaping Use   • Vaping Use: Never used   Substance and Sexual Activity   • Alcohol use: Defer   • Drug use: Never   • Sexual activity: Defer        I reviewed the patient's chief complaint, history of present illness, review of systems, past medical history, surgical history, family history, social history, medications, and allergy list.     REVIEW OF SYSTEMS    Constitutional: Denies fevers, chills, weight loss  Cardiovascular: Denies chest pain, shortness of breath  Skin: Denies rashes, acute skin changes  Neurologic: Denies headache, loss of consciousness  MSK: Left shoulder pain      Objective   Vital Signs:   Ht 162.6 cm (64\")   Wt 101 kg (223 lb)   BMI 38.28 kg/m²     Body mass index is 38.28 kg/m².    Physical Exam    General: Alert. No acute distress.   Left upper extremity: Nontender to palpation of the shoulder.  " Active forward elevation under 90 degrees.  External rotation 60 degrees.  Internal rotation to lower lumbar spine.  5 out of 5 rotator cuff testing without pain.  Full elbow flexion and extension.  Active wrist and finger range of motion.  Thumb opposition intact.  Palmar adduction of thumb intact.  Sensation intact axillary, median, radial, and ulnar nerve distributions.  Palpable radial pulse.    Procedures    Imaging Results (Most Recent)     None                 Assessment and Plan    Diagnoses and all orders for this visit:    1. Anterior dislocation of left shoulder, subsequent encounter (Primary)        Adore Neves presents today for a follow-up of her left shoulder.  Patient had a left shoulder anterior dislocation on 8/5/2022.  Patient is instructed to continue with formal physical therapy as well as her home exercises.  We discussed the importance of maintaining her range of motion and improving her strength.  Patient expressed understanding.  Work note was written today with a 10 pound lifting restriction.  Continue with ibuprofen as needed.  Patient will follow up in 4 weeks for reevaluation.  No new x-rays needed next visit.    Call or return if symptoms worsen or patient has any concerns.         Follow Up   Return in about 4 weeks (around 11/2/2022).  Patient was given instructions and counseling regarding her condition or for health maintenance advice. Please see specific information pulled into the AVS if appropriate.     Mery Roche PA-C  10/05/22  07:42 EDT

## 2022-10-05 ENCOUNTER — OFFICE VISIT (OUTPATIENT)
Dept: ORTHOPEDIC SURGERY | Facility: CLINIC | Age: 62
End: 2022-10-05

## 2022-10-05 VITALS — WEIGHT: 223 LBS | BODY MASS INDEX: 38.07 KG/M2 | HEIGHT: 64 IN

## 2022-10-05 DIAGNOSIS — S43.015D ANTERIOR DISLOCATION OF LEFT SHOULDER, SUBSEQUENT ENCOUNTER: Primary | ICD-10-CM

## 2022-10-05 PROCEDURE — 99213 OFFICE O/P EST LOW 20 MIN: CPT | Performed by: PHYSICIAN ASSISTANT

## 2022-10-19 ENCOUNTER — OFFICE VISIT (OUTPATIENT)
Dept: FAMILY MEDICINE CLINIC | Facility: CLINIC | Age: 62
End: 2022-10-19

## 2022-10-19 VITALS
HEART RATE: 81 BPM | HEIGHT: 64 IN | DIASTOLIC BLOOD PRESSURE: 78 MMHG | SYSTOLIC BLOOD PRESSURE: 128 MMHG | WEIGHT: 216 LBS | OXYGEN SATURATION: 98 % | BODY MASS INDEX: 36.88 KG/M2 | TEMPERATURE: 96.9 F

## 2022-10-19 DIAGNOSIS — Z76.0 MEDICATION REFILL: ICD-10-CM

## 2022-10-19 DIAGNOSIS — E78.2 MIXED HYPERLIPIDEMIA: ICD-10-CM

## 2022-10-19 DIAGNOSIS — I10 ESSENTIAL HYPERTENSION: ICD-10-CM

## 2022-10-19 DIAGNOSIS — E11.9 TYPE 2 DIABETES MELLITUS WITHOUT COMPLICATION, WITHOUT LONG-TERM CURRENT USE OF INSULIN: ICD-10-CM

## 2022-10-19 DIAGNOSIS — E66.01 CLASS 2 SEVERE OBESITY DUE TO EXCESS CALORIES WITH SERIOUS COMORBIDITY AND BODY MASS INDEX (BMI) OF 37.0 TO 37.9 IN ADULT: ICD-10-CM

## 2022-10-19 DIAGNOSIS — Z23 NEED FOR VACCINATION FOR STREP PNEUMONIAE + INFLUENZA: ICD-10-CM

## 2022-10-19 DIAGNOSIS — Z12.31 ENCOUNTER FOR SCREENING MAMMOGRAM FOR MALIGNANT NEOPLASM OF BREAST: ICD-10-CM

## 2022-10-19 DIAGNOSIS — Z00.00 ANNUAL PHYSICAL EXAM: Primary | ICD-10-CM

## 2022-10-19 DIAGNOSIS — G89.4 CHRONIC PAIN SYNDROME: ICD-10-CM

## 2022-10-19 DIAGNOSIS — E55.9 VITAMIN D DEFICIENCY: ICD-10-CM

## 2022-10-19 LAB
25(OH)D3 SERPL-MCNC: 42.8 NG/ML (ref 30–100)
ALBUMIN SERPL-MCNC: 4.6 G/DL (ref 3.5–5.2)
ALBUMIN/GLOB SERPL: 1.6 G/DL
ALP SERPL-CCNC: 50 U/L (ref 39–117)
ALT SERPL W P-5'-P-CCNC: 21 U/L (ref 1–33)
ANION GAP SERPL CALCULATED.3IONS-SCNC: 13 MMOL/L (ref 5–15)
AST SERPL-CCNC: 21 U/L (ref 1–32)
BASOPHILS # BLD AUTO: 0.08 10*3/MM3 (ref 0–0.2)
BASOPHILS NFR BLD AUTO: 0.8 % (ref 0–1.5)
BILIRUB SERPL-MCNC: 0.2 MG/DL (ref 0–1.2)
BUN SERPL-MCNC: 25 MG/DL (ref 8–23)
BUN/CREAT SERPL: 22.7 (ref 7–25)
CALCIUM SPEC-SCNC: 10.1 MG/DL (ref 8.6–10.5)
CHLORIDE SERPL-SCNC: 101 MMOL/L (ref 98–107)
CHOLEST SERPL-MCNC: 189 MG/DL (ref 0–200)
CO2 SERPL-SCNC: 26 MMOL/L (ref 22–29)
CREAT SERPL-MCNC: 1.1 MG/DL (ref 0.57–1)
DEPRECATED RDW RBC AUTO: 41.2 FL (ref 37–54)
EGFRCR SERPLBLD CKD-EPI 2021: 57.3 ML/MIN/1.73
EOSINOPHIL # BLD AUTO: 0.28 10*3/MM3 (ref 0–0.4)
EOSINOPHIL NFR BLD AUTO: 2.6 % (ref 0.3–6.2)
ERYTHROCYTE [DISTWIDTH] IN BLOOD BY AUTOMATED COUNT: 12.2 % (ref 12.3–15.4)
GLOBULIN UR ELPH-MCNC: 2.8 GM/DL
GLUCOSE SERPL-MCNC: 120 MG/DL (ref 65–99)
HBA1C MFR BLD: 7.4 % (ref 4.8–5.6)
HCT VFR BLD AUTO: 47.2 % (ref 34–46.6)
HDLC SERPL-MCNC: 49 MG/DL (ref 40–60)
HGB BLD-MCNC: 15.9 G/DL (ref 12–15.9)
IMM GRANULOCYTES # BLD AUTO: 0.05 10*3/MM3 (ref 0–0.05)
IMM GRANULOCYTES NFR BLD AUTO: 0.5 % (ref 0–0.5)
LDLC SERPL CALC-MCNC: 106 MG/DL (ref 0–100)
LDLC/HDLC SERPL: 2.06 {RATIO}
LYMPHOCYTES # BLD AUTO: 3.95 10*3/MM3 (ref 0.7–3.1)
LYMPHOCYTES NFR BLD AUTO: 37.1 % (ref 19.6–45.3)
MCH RBC QN AUTO: 30.9 PG (ref 26.6–33)
MCHC RBC AUTO-ENTMCNC: 33.7 G/DL (ref 31.5–35.7)
MCV RBC AUTO: 91.8 FL (ref 79–97)
MONOCYTES # BLD AUTO: 0.76 10*3/MM3 (ref 0.1–0.9)
MONOCYTES NFR BLD AUTO: 7.1 % (ref 5–12)
NEUTROPHILS NFR BLD AUTO: 5.54 10*3/MM3 (ref 1.7–7)
NEUTROPHILS NFR BLD AUTO: 51.9 % (ref 42.7–76)
NRBC BLD AUTO-RTO: 0 /100 WBC (ref 0–0.2)
PLATELET # BLD AUTO: 244 10*3/MM3 (ref 140–450)
PMV BLD AUTO: 11.6 FL (ref 6–12)
POTASSIUM SERPL-SCNC: 4.5 MMOL/L (ref 3.5–5.2)
PROT SERPL-MCNC: 7.4 G/DL (ref 6–8.5)
RBC # BLD AUTO: 5.14 10*6/MM3 (ref 3.77–5.28)
SODIUM SERPL-SCNC: 140 MMOL/L (ref 136–145)
TRIGL SERPL-MCNC: 196 MG/DL (ref 0–150)
TSH SERPL DL<=0.05 MIU/L-ACNC: 2.19 UIU/ML (ref 0.27–4.2)
VLDLC SERPL-MCNC: 34 MG/DL (ref 5–40)
WBC NRBC COR # BLD: 10.66 10*3/MM3 (ref 3.4–10.8)

## 2022-10-19 PROCEDURE — 99396 PREV VISIT EST AGE 40-64: CPT | Performed by: NURSE PRACTITIONER

## 2022-10-19 PROCEDURE — 90677 PCV20 VACCINE IM: CPT | Performed by: NURSE PRACTITIONER

## 2022-10-19 PROCEDURE — 82306 VITAMIN D 25 HYDROXY: CPT | Performed by: NURSE PRACTITIONER

## 2022-10-19 PROCEDURE — 90471 IMMUNIZATION ADMIN: CPT | Performed by: NURSE PRACTITIONER

## 2022-10-19 PROCEDURE — 80050 GENERAL HEALTH PANEL: CPT | Performed by: NURSE PRACTITIONER

## 2022-10-19 PROCEDURE — 99213 OFFICE O/P EST LOW 20 MIN: CPT | Performed by: NURSE PRACTITIONER

## 2022-10-19 PROCEDURE — 80061 LIPID PANEL: CPT | Performed by: NURSE PRACTITIONER

## 2022-10-19 PROCEDURE — 90686 IIV4 VACC NO PRSV 0.5 ML IM: CPT | Performed by: NURSE PRACTITIONER

## 2022-10-19 PROCEDURE — 83036 HEMOGLOBIN GLYCOSYLATED A1C: CPT | Performed by: NURSE PRACTITIONER

## 2022-10-19 RX ORDER — GABAPENTIN 600 MG/1
600 TABLET ORAL 4 TIMES DAILY
Qty: 360 TABLET | Refills: 1 | Status: SHIPPED | OUTPATIENT
Start: 2022-10-19 | End: 2022-10-21

## 2022-10-19 RX ORDER — SEMAGLUTIDE 1.34 MG/ML
0.5 INJECTION, SOLUTION SUBCUTANEOUS WEEKLY
Qty: 3 ML | Refills: 5 | Status: SHIPPED | OUTPATIENT
Start: 2022-10-19 | End: 2022-11-18

## 2022-10-19 RX ORDER — GABAPENTIN 600 MG/1
600 TABLET ORAL 4 TIMES DAILY
Qty: 360 TABLET | Refills: 1 | Status: CANCELLED | OUTPATIENT
Start: 2022-10-19

## 2022-10-19 RX ORDER — TRAMADOL HYDROCHLORIDE 50 MG/1
50 TABLET ORAL EVERY 8 HOURS PRN
Qty: 120 TABLET | Refills: 1 | Status: SHIPPED | OUTPATIENT
Start: 2022-10-19 | End: 2022-12-07 | Stop reason: SDUPTHER

## 2022-10-19 RX ORDER — SEMAGLUTIDE 1.34 MG/ML
0.5 INJECTION, SOLUTION SUBCUTANEOUS WEEKLY
Qty: 3 ML | Refills: 5 | Status: SHIPPED | OUTPATIENT
Start: 2022-10-19 | End: 2022-10-19

## 2022-10-19 NOTE — PROGRESS NOTES
Chief Complaint  Diabetes and Annual Exam    Subjective          Medical History: has a past medical history of Chronic pain, COPD (chronic obstructive pulmonary disease) (HCC), Diabetes mellitus (HCC), Hyperlipidemia, Hypertension, and Vitamin D deficiency.     Surgical History: has a past surgical history that includes Abdominal hysterectomy; Replacement total knee bilateral; Laparoscopic cholecystectomy; and Tonsillectomy.     Family History: family history includes Diabetes in her brother; Heart disease in her father, mother, and another family member; Nephrolithiasis in her brother; Other in her brother.     Social History: reports that she quit smoking about 13 months ago. Her smoking use included cigarettes. She started smoking about 42 years ago. She has a 20.00 pack-year smoking history. She has never used smokeless tobacco. Alcohol use questions deferred to the physician. She reports that she does not use drugs.    Adore Neves presents to [facility]  History of Present Illness    ENCOUNTER DATE:  10/19/2022    Adore Neves / 62 y.o. / female      CHIEF COMPLAINT    Diabetes and Follow-up      VITALS    There were no vitals taken for this visit.    BP Readings from Last 3 Encounters:  08/05/22 : 147/84  04/20/22 : 110/58  01/20/22 : 132/64    Wt Readings from Last 3 Encounters:  10/05/22 : 101 kg (223 lb)  08/31/22 : 101 kg (223 lb)  08/15/22 : 99.3 kg (219 lb)    There is no height or weight on file to calculate BMI.    MEDICATIONS    Current Outpatient Medications on File Prior to Visit:  albuterol sulfate  (90 Base) MCG/ACT inhaler, Inhale 2 puffs Every 4 (Four) Hours As Needed for Wheezing., Disp: 8.5 g, Rfl: 2  atorvastatin (LIPITOR) 80 MG tablet, Take 1 tablet by mouth Daily., Disp: 90 tablet, Rfl: 1  ergocalciferol (ERGOCALCIFEROL) 1.25 MG (18112 UT) capsule, Take 1 capsule by mouth 1 (One) Time Per Week., Disp: 13 capsule, Rfl: 0  fenofibrate 160 MG  tablet, Take 1 tablet by mouth Daily., Disp: 90 tablet, Rfl: 1  fexofenadine (ALLEGRA) 180 MG tablet, Take 1 tablet by mouth Daily., Disp: 90 tablet, Rfl: 1  fluticasone (FLONASE) 50 MCG/ACT nasal spray, 2 sprays into the nostril(s) as directed by provider Daily., Disp: 16 g, Rfl: 1  gabapentin (NEURONTIN) 600 MG tablet, Take 1 tablet by mouth 4 (Four) Times a Day., Disp: 360 tablet, Rfl: 0  glucose blood test strip, 1 each by Other route As Needed (USE 2 TIMES NEWBERRY UP TO 3 TIMES A DAY IF NEEDED). Use as instructed UP TO 3 TIMES A DAY, Disp: 200 each, Rfl: 2  glyburide (DIAbeta) 5 MG tablet, Take 1 tablet by mouth 2 (Two) Times a Day With Meals., Disp: 180 tablet, Rfl: 1  ibuprofen (ADVIL,MOTRIN) 600 MG tablet, Take 600 mg by mouth Every 6 (Six) Hours As Needed. for pain, Disp: , Rfl:   ipratropium-albuterol (DUO-NEB) 0.5-2.5 mg/3 ml nebulizer, Take 3 mL by nebulization Every 4 (Four) Hours As Needed for Wheezing., Disp: 360 mL, Rfl: 1  Lancets misc, 1 each 2 (Two) Times a Day As Needed (US 2 TIMES NEWBERRY UP TO 3 TIMES A DAY IF NEEDED)., Disp: 200 each, Rfl: 2  metFORMIN (GLUCOPHAGE) 1000 MG tablet, Take 1 tablet by mouth 2 (Two) Times a Day With Meals., Disp: 180 tablet, Rfl: 1  multivitamin with minerals (MULTIVITAMIN ADULT PO), Take 1 tablet by mouth Daily., Disp: 90 tablet, Rfl: 1  traMADol (ULTRAM) 50 MG tablet, TAKE 2 TABLETS BY MOUTH EVERY 6 HOURS AS NEEDED FOR MODERATE PAIN, Disp: 120 tablet, Rfl: 1  Trulicity 0.75 MG/0.5ML solution pen-injector, INJECT 0.5ML (=0.75MG)     SUBCUTANEOUSLY INTO THE    APPROPRIATE AREA AS        DIRECTED ONCE WEEKLY, Disp: 2 mL, Rfl: 0  metoprolol tartrate (LOPRESSOR) 25 MG tablet, Take 1 tablet by mouth 2 (Two) Times a Day for 90 days., Disp: 180 tablet, Rfl: 1    No current facility-administered medications on file prior to visit.        HISTORY OF PRESENT ILLNESS    Adore presents for annual health maintenance visit.    Last health maintenance visit: approximately 1 year  ago  General health: multiple medical problems  Lifestyle:  Attempting to lose weight?: No   Diet: eats decently  Exercise: walks regularly  Tobacco: Former   Alcohol: does not drink  Work: Full-time  Reproductive health:  Sexually active?: No   Sexual problems?: No problems  Concern for STD?: No    Sees Gynecologist?: No   Annette/Postmenopausal?: Yes   Domestic abuse concerns: No   Depression Screening:     PHQ-2: 0 (Not depressed)  PHQ-9: 0 (Negative screening for depression)    Patient Care Team:  Kiesha Evans APRN as PCP - General (Nurse Practitioner)      ALLERGIES  No Known Allergies     PFSH:     The following portions of the patient's history were reviewed and updated as appropriate: Allergies / Current Medications / Past Medical History / Surgical History / Social History / Family History    PROBLEM LIST   Patient Active Problem List:    Arthritis      PAST MEDICAL HISTORY  Past Medical History:  No date: Chronic pain  No date: COPD (chronic obstructive pulmonary disease) (HCC)  No date: Diabetes mellitus (HCC)  No date: Hyperlipidemia  No date: Hypertension  No date: Vitamin D deficiency    SURGICAL HISTORY  Past Surgical History:  No date: ABDOMINAL HYSTERECTOMY  No date: LAPAROSCOPIC CHOLECYSTECTOMY  No date: REPLACEMENT TOTAL KNEE BILATERAL  No date: TONSILLECTOMY    SOCIAL HISTORY  Social History   Socioeconomic History     Marital status:    Tobacco Use     Smoking status: Former       Packs/day: 0.50       Years: 40.00       Pack years: 20       Types: Cigarettes       Start date:        Quit date: 2021       Years since quittin.1     Smokeless tobacco: Never     Tobacco comments: SOME SECOND HAND SMOKE EXPOSURE   Vaping Use     Vaping Use: Never used   Substance and Sexual Activity     Alcohol use: Defer     Drug use: Never     Sexual activity: Defer      FAMILY HISTORY  Review of patient's family history indicates:      IMMUNIZATION HISTORY    Immunization  History  Administered            Date(s) Administered   FluLaval/Fluzone >6mos                         01/20/2022     Influenza, Unspecified                         10/07/2020     Pneumococcal Conjugate 13-Valent (PCV13)                         01/15/2020    Labs:    Lab Results      Component                Value               Date                      NA                       141                 04/20/2022                K                        4.6                 04/20/2022                CALCIUM                  10.0                04/20/2022                AST                      17                  04/20/2022                ALT                      17                  04/20/2022                BUN                      20                  04/20/2022                CREATININE               0.82                04/20/2022                CREATININE               1.17 (H)            01/20/2022                CREATININE               0.80                03/26/2021                EGFRIFNONA               47 (L)              01/20/2022              Lab Results      Component                Value               Date                      HGBA1C                   7.60 (H)            04/20/2022                HGBA1C                   9.87 (H)            01/20/2022                HGBA1C                   9.5 (H)             03/26/2021                TSH                      1.100               04/20/2022                FREET4                   1.4                 03/26/2021              Lab Results      Component                Value               Date                      LDL                      90                  04/20/2022                HDL                      41                  04/20/2022                TRIG                     170 (H)             04/20/2022                CHOLHDLRATIO             2.6 (L)             03/26/2021              Lab Results      Component                Value               Date                       [id number]                 47.0                04/20/2022               Lab Results      Component                Value               Date                      WBC                      9.63                04/20/2022                HGB                      15.2                04/20/2022                MCV                      89.4                04/20/2022                PLT                      232                 04/20/2022              No results found for: PROTEIN, GLUCOSEU, BLOODU, NITRITEU, LEUKOCYTESUR    No results found for: HEPCVIRUSABY        ASSESSMENT & PLAN    ANNUAL WELLNESS EXAM / PHYSICAL     HEALTHCARE MAINTENANCE ISSUES    Cancer Screening:  Colon: Initial/Next screening at age: PATIENT DEFERS COLONOSCOPY OR COLOGUARD AT THIS TIME  Repeat colon cancer screening: N/A at this time  Breast: Recommended monthly self exams; annual professional exam  Mammogram: every 2 years  Cervical: N/A s/p total hysterectomy  Skin: Monthly self skin examination, annual exam by health professional      Lifestyle Counseling:  Lifestyle Modifications: Attempt to lose weight, Continue good lifestyle choices/modifications and Improve dietary compliance  Safety Issues: Always wear seatbelt, Avoid texting while driving   Use sunscreen, regular skin examination  Recommended annual dental/vision examination.  Emotional/Stress/Sleep: Reviewed and  given when appropriate  Diabetes  She presents for her follow-up diabetic visit. She has type 2 diabetes mellitus. Her disease course has been stable. There are no hypoglycemic associated symptoms. There are no diabetic associated symptoms. There are no hypoglycemic complications. Risk factors for coronary artery disease include diabetes mellitus, dyslipidemia, obesity, post-menopausal and hypertension. Current diabetic treatment includes oral agent (triple therapy). She is compliant with treatment all of the time. She is following a generally healthy diet. When  "asked about meal planning, she reported none. An ACE inhibitor/angiotensin II receptor blocker is being taken.   Hypertension  This is a chronic problem. The current episode started more than 1 year ago. Pertinent negatives include no anxiety, peripheral edema or shortness of breath. Risk factors for coronary artery disease include dyslipidemia, diabetes mellitus, post-menopausal state and obesity. Past treatments include beta blockers. Current antihypertension treatment includes beta blockers. The current treatment provides significant improvement. There are no compliance problems.    Hyperlipidemia  This is a chronic problem. The current episode started more than 1 year ago. Exacerbating diseases include diabetes and obesity. Pertinent negatives include no shortness of breath. Current antihyperlipidemic treatment includes statins. The current treatment provides moderate improvement of lipids. Risk factors for coronary artery disease include diabetes mellitus, family history, dyslipidemia, hypertension, obesity and post-menopausal.     Objective   Vital Signs:   /78   Pulse 81   Temp 96.9 °F (36.1 °C)   Ht 162.6 cm (64\")   Wt 98 kg (216 lb)   SpO2 98%   BMI 37.08 kg/m²     Physical Exam  Vitals reviewed.   Constitutional:       Appearance: Normal appearance. She is well-developed. She is obese.   HENT:      Head: Normocephalic and atraumatic.      Right Ear: Tympanic membrane and external ear normal.      Left Ear: Tympanic membrane and external ear normal.   Eyes:      Conjunctiva/sclera: Conjunctivae normal.      Pupils: Pupils are equal, round, and reactive to light.   Cardiovascular:      Rate and Rhythm: Normal rate and regular rhythm.      Heart sounds: No murmur heard.    No friction rub. No gallop.   Pulmonary:      Effort: Pulmonary effort is normal.      Breath sounds: Normal breath sounds. No wheezing or rhonchi.   Skin:     General: Skin is warm and dry.   Neurological:      Mental Status: " She is alert and oriented to person, place, and time.   Psychiatric:         Mood and Affect: Mood and affect normal.         Behavior: Behavior normal.         Thought Content: Thought content normal.         Judgment: Judgment normal.        Result Review :   The following data was reviewed by: HUSAM Brooks on 10/19/2022:  Common labs    Common Labs 1/20/22 1/20/22 1/20/22 1/20/22 4/20/22 4/20/22 4/20/22 4/20/22 4/20/22    0820 0820 0820 0820 0815 0815 0815 0815 0815   Glucose   182 (A)     74    BUN   16     20    Creatinine   1.17 (A)     0.82    eGFR Non African Am   47 (A)         Sodium   140     141    Potassium   4.7     4.6    Chloride   102     101    Calcium   9.7     10.0    Albumin   4.50     4.30    Total Bilirubin   0.3     0.2    Alkaline Phosphatase   56     41    AST (SGOT)   14     17    ALT (SGPT)   19     17    WBC 9.66    9.63       Hemoglobin 16.1 (A)    15.2       Hematocrit 48.9 (A)    44.7       Platelets 241    232       Total Cholesterol    114     160   Triglycerides    130     170 (A)   HDL Cholesterol    42     41   LDL Cholesterol     49     90   Hemoglobin A1C  9.87 (A)    7.60 (A)      Microalbumin, Urine       <1.2     (A) Abnormal value            Data reviewed: previous office note            Current Outpatient Medications on File Prior to Visit   Medication Sig Dispense Refill   • albuterol sulfate  (90 Base) MCG/ACT inhaler Inhale 2 puffs Every 4 (Four) Hours As Needed for Wheezing. 8.5 g 2   • atorvastatin (LIPITOR) 80 MG tablet Take 1 tablet by mouth Daily. 90 tablet 1   • ergocalciferol (ERGOCALCIFEROL) 1.25 MG (81672 UT) capsule Take 1 capsule by mouth 1 (One) Time Per Week. 13 capsule 0   • fenofibrate 160 MG tablet Take 1 tablet by mouth Daily. 90 tablet 1   • fexofenadine (ALLEGRA) 180 MG tablet Take 1 tablet by mouth Daily. 90 tablet 1   • fluticasone (FLONASE) 50 MCG/ACT nasal spray 2 sprays into the nostril(s) as directed by provider Daily. 16 g  1   • glucose blood test strip 1 each by Other route As Needed (USE 2 TIMES NEWBERRY UP TO 3 TIMES A DAY IF NEEDED). Use as instructed UP TO 3 TIMES A  each 2   • glyburide (DIAbeta) 5 MG tablet Take 1 tablet by mouth 2 (Two) Times a Day With Meals. 180 tablet 1   • ibuprofen (ADVIL,MOTRIN) 600 MG tablet Take 600 mg by mouth Every 6 (Six) Hours As Needed. for pain     • ipratropium-albuterol (DUO-NEB) 0.5-2.5 mg/3 ml nebulizer Take 3 mL by nebulization Every 4 (Four) Hours As Needed for Wheezing. 360 mL 1   • Lancets misc 1 each 2 (Two) Times a Day As Needed (US 2 TIMES NEWBERRY UP TO 3 TIMES A DAY IF NEEDED). 200 each 2   • metFORMIN (GLUCOPHAGE) 1000 MG tablet Take 1 tablet by mouth 2 (Two) Times a Day With Meals. 180 tablet 1   • multivitamin with minerals (MULTIVITAMIN ADULT PO) Take 1 tablet by mouth Daily. 90 tablet 1   • [DISCONTINUED] gabapentin (NEURONTIN) 600 MG tablet Take 1 tablet by mouth 4 (Four) Times a Day. 360 tablet 0   • [DISCONTINUED] traMADol (ULTRAM) 50 MG tablet TAKE 2 TABLETS BY MOUTH EVERY 6 HOURS AS NEEDED FOR MODERATE PAIN 120 tablet 1   • [DISCONTINUED] Trulicity 0.75 MG/0.5ML solution pen-injector INJECT 0.5ML (=0.75MG)     SUBCUTANEOUSLY INTO THE    APPROPRIATE AREA AS        DIRECTED ONCE WEEKLY 2 mL 0   • metoprolol tartrate (LOPRESSOR) 25 MG tablet Take 1 tablet by mouth 2 (Two) Times a Day for 90 days. 180 tablet 1     No current facility-administered medications on file prior to visit.        Assessment and Plan    Diagnoses and all orders for this visit:    1. Annual physical exam (Primary)    2. Medication refill  Comments:  Tramadol refilled at today's visit.  Ole and UDS appropriate.  Orders:  -     traMADol (ULTRAM) 50 MG tablet; Take 1 tablet by mouth Every 8 (Eight) Hours As Needed for Moderate Pain or Severe Pain.  Dispense: 120 tablet; Refill: 1    3. Chronic pain syndrome  Comments:  Patient needs refill of her gabapentin.  Orders:  -     gabapentin (NEURONTIN) 600  MG tablet; Take 1 tablet by mouth 4 (Four) Times a Day.  Dispense: 360 tablet; Refill: 1    4. Mixed hyperlipidemia  Comments:  We will recheck labs, adjust medication if needed  Orders:  -     CBC Auto Differential  -     Comprehensive Metabolic Panel  -     Lipid Panel    5. Essential hypertension  Comments:  Blood pressure stable at 128/78 we will continue on current medication regimen.    6. Type 2 diabetes mellitus without complication, without long-term current use of insulin ([facility])  Comments:  Pt's last A1c was 7.6, insurance quit paying for Trulicity has been out of medication for 1 month.  Pt given coupon of Ozempic and since Ozempic to Nikhil  Orders:  -     CBC Auto Differential  -     Comprehensive Metabolic Panel  -     Hemoglobin A1c  -     Lipid Panel  -     TSH    7. Vitamin D deficiency  -     Vitamin D 25 Hydroxy    8. Encounter for screening mammogram for malignant neoplasm of breast  Comments:  Patient agreeable for mammogram ordered today.  Orders:  -     Mammo Screening Digital Tomosynthesis Bilateral With CAD; Future    9. Need for vaccination for strep pneumoniae + influenza  Comments:  Patient agreeable to get strep and pneumonia vaccine today.  Orders:  -     FluLaval/Fluzone >6 mos (9384-2256)  -     Pneumococcal Conjugate Vaccine 20-Valent (PCV20)    10. Class 2 severe obesity due to excess calories with serious comorbidity and body mass index (BMI) of 37.0 to 37.9 in adult ([facility])    Other orders  -     Discontinue: Semaglutide,0.25 or 0.5MG/DOS, (Ozempic, 0.25 or 0.5 MG/DOSE,) 2 MG/1.5ML solution pen-injector; Inject 0.5 mg under the skin into the appropriate area as directed 1 (One) Time Per Week for 30 days.  Dispense: 3 mL; Refill: 5  -     Semaglutide,0.25 or 0.5MG/DOS, (Ozempic, 0.25 or 0.5 MG/DOSE,) 2 MG/1.5ML solution pen-injector; Inject 0.5 mg under the skin into the appropriate area as directed 1 (One) Time Per Week for 30 days.  Dispense: 3 mL; Refill: 5        Follow Up    Return in about 3 months (around 1/19/2023) for diabetes.  Patient was given instructions and counseling regarding her condition or for health maintenance advice. Please see specific information pulled into the AVS if appropriate.

## 2022-10-20 DIAGNOSIS — R94.4 DECREASED GFR: Primary | ICD-10-CM

## 2022-10-21 DIAGNOSIS — G89.4 CHRONIC PAIN SYNDROME: ICD-10-CM

## 2022-10-21 RX ORDER — GABAPENTIN 600 MG/1
TABLET ORAL
Qty: 360 TABLET | Refills: 1 | Status: SHIPPED | OUTPATIENT
Start: 2022-10-21

## 2022-11-08 NOTE — PROGRESS NOTES
"Chief Complaint  Follow-up of the Left Shoulder    Subjective          Adore Neves presents to [facility]   History of Present Illness    Adore Neves presents today for a follow-up of her left shoulder. Patient has a left shoulder anterior dislocation on 2022.  Today, patient states she is doing well.  She reports no pain to her shoulder.  She has finished physical therapy and has been doing her home exercises.  She states that her range of motion is good and she continues to work on strengthening exercises.  She reports an 8 out of 10 strength.  She explains that she is able to do all activities without complications.  Denies numbness or tingling.  Denies new injuries.      No Known Allergies     Social History     Socioeconomic History   • Marital status:    Tobacco Use   • Smoking status: Former     Packs/day: 0.50     Years: 40.00     Pack years: 20.00     Types: Cigarettes     Start date:      Quit date: 2021     Years since quittin.1   • Smokeless tobacco: Never   • Tobacco comments:     SOME SECOND HAND SMOKE EXPOSURE   Vaping Use   • Vaping Use: Never used   Substance and Sexual Activity   • Alcohol use: Defer   • Drug use: Never   • Sexual activity: Defer        I reviewed the patient's chief complaint, history of present illness, review of systems, past medical history, surgical history, family history, social history, medications, and allergy list.     REVIEW OF SYSTEMS    Constitutional: Denies fevers, chills, weight loss  Cardiovascular: Denies chest pain, shortness of breath  Skin: Denies rashes, acute skin changes  Neurologic: Denies headache, loss of consciousness  MSK: left shoulder pain      Objective   Vital Signs:   Ht 162.6 cm (64\")   Wt 98 kg (216 lb)   BMI 37.08 kg/m²     Body mass index is 37.08 kg/m².    Physical Exam    General: Alert. No acute distress.   Left upper extremity: No areas of tenderness to the shoulder.  Active " forward elevation 180 degrees.  External rotation 60 degrees.  Internal rotation to the lower thoracic spine.  5 out of 5 rotator cuff testing.  Full elbow range of motion.  Active wrist range of motion.  Full finger flexion.  Full finger extension.  Thumb opposition intact.  Palmar adduction of thumb intact.  Negative impingement testing.  Sensation intact to the axillary, median, radial, and ulnar nerve distributions.  Palpable radial pulse.    Procedures    Imaging Results (Most Recent)     None                 Assessment and Plan    Diagnoses and all orders for this visit:    1. Anterior dislocation of left shoulder, subsequent encounter (Primary)        Adore Neves presents today a follow-up of her left shoulder anterior dislocation that occurred on 8/5/2022.   Patient is instructed to continue with her home exercises.  Continue working on strengthening exercises and maintaining her range of motion.  Continue with activities as tolerated.  Work note written today.        Patient will follow-up as needed.    Call or return if symptoms worsen or patient has any concerns.       Follow Up   Return if symptoms worsen or fail to improve.  Patient was given instructions and counseling regarding her condition or for health maintenance advice. Please see specific information pulled into the AVS if appropriate.     Mery Roche PA-C  11/09/22  10:49 EST

## 2022-11-09 ENCOUNTER — OFFICE VISIT (OUTPATIENT)
Dept: ORTHOPEDIC SURGERY | Facility: CLINIC | Age: 62
End: 2022-11-09

## 2022-11-09 VITALS — HEIGHT: 64 IN | BODY MASS INDEX: 36.88 KG/M2 | WEIGHT: 216 LBS

## 2022-11-09 DIAGNOSIS — S43.015D ANTERIOR DISLOCATION OF LEFT SHOULDER, SUBSEQUENT ENCOUNTER: Primary | ICD-10-CM

## 2022-11-09 PROCEDURE — 99213 OFFICE O/P EST LOW 20 MIN: CPT | Performed by: PHYSICIAN ASSISTANT

## 2022-11-21 ENCOUNTER — TELEPHONE (OUTPATIENT)
Dept: FAMILY MEDICINE CLINIC | Facility: CLINIC | Age: 62
End: 2022-11-21

## 2022-11-21 RX ORDER — DULAGLUTIDE 0.75 MG/.5ML
0.75 INJECTION, SOLUTION SUBCUTANEOUS WEEKLY
Qty: 6 ML | Refills: 1 | Status: SHIPPED | OUTPATIENT
Start: 2022-11-21 | End: 2023-01-18 | Stop reason: CLARIF

## 2022-11-21 NOTE — TELEPHONE ENCOUNTER
Caller:     Relationship:     Best call back number: [phone]    Requested Prescriptions: [facility]    Pharm[facility] where [facility] should be sent[facility] - [place], [place] - [address] [place] [address] - [phone]  - [phone] FX     Additional details provided by patient:     Does the patient have less than a 3 day supply:  [x] Yes  [] No    Silas Ortega Rep   11/21/22 14:52 EST

## 2022-11-22 NOTE — TELEPHONE ENCOUNTER
"Called patient to notify and inform of Rx update, however was unable to reach patient or LVM \"patient's voicemail is full & can not accept messages at this time\". Will message patient via [employer] communication.  "

## 2022-12-07 ENCOUNTER — TELEPHONE (OUTPATIENT)
Dept: FAMILY MEDICINE CLINIC | Facility: CLINIC | Age: 62
End: 2022-12-07

## 2022-12-07 DIAGNOSIS — Z76.0 MEDICATION REFILL: ICD-10-CM

## 2022-12-07 NOTE — TELEPHONE ENCOUNTER
Caller: PuraAdore    Relationship: Self    Best call back number: [phone]    Requested Prescriptions:   Requested Prescriptions     Pending Prescriptions Disp Refills   • traMADol (ULTRAM) 50 MG tablet 120 tablet 1     Sig: Take 1 tablet by mouth Every 8 (Eight) Hours As Needed for Moderate Pain or Severe Pain.        Pharmacy where request should be sent: [id number] - [place] [address] AT SEC [address]  [address] [facility] [phone] [facility] [phone] FX       Does the patient have less than a 3 day supply:  [x] Yes  [] No    Would you like a call back once the refill request has been completed: [x] Yes [] No    If the office needs to give you a call back, can they leave a voicemail: [x] Yes [] No    Silas Garner Rep   12/07/22 15:04 EST

## 2022-12-08 RX ORDER — TRAMADOL HYDROCHLORIDE 50 MG/1
50 TABLET ORAL EVERY 8 HOURS PRN
Qty: 120 TABLET | Refills: 1 | Status: SHIPPED | OUTPATIENT
Start: 2022-12-08 | End: 2023-03-28 | Stop reason: SDUPTHER

## 2023-01-10 DIAGNOSIS — Z76.0 MEDICATION REFILL: ICD-10-CM

## 2023-01-10 RX ORDER — TRAMADOL HYDROCHLORIDE 50 MG/1
50 TABLET ORAL EVERY 8 HOURS PRN
Qty: 120 TABLET | Refills: 1 | OUTPATIENT
Start: 2023-01-10

## 2023-01-18 ENCOUNTER — OFFICE VISIT (OUTPATIENT)
Dept: FAMILY MEDICINE CLINIC | Facility: CLINIC | Age: 63
End: 2023-01-18
Payer: COMMERCIAL

## 2023-01-18 VITALS
HEART RATE: 82 BPM | WEIGHT: 211 LBS | BODY MASS INDEX: 36.02 KG/M2 | HEIGHT: 64 IN | OXYGEN SATURATION: 97 % | SYSTOLIC BLOOD PRESSURE: 126 MMHG | DIASTOLIC BLOOD PRESSURE: 58 MMHG | TEMPERATURE: 97.5 F

## 2023-01-18 DIAGNOSIS — Z12.31 ENCOUNTER FOR SCREENING MAMMOGRAM FOR MALIGNANT NEOPLASM OF BREAST: ICD-10-CM

## 2023-01-18 DIAGNOSIS — Z79.899 MEDICATION MANAGEMENT: ICD-10-CM

## 2023-01-18 DIAGNOSIS — R94.4 DECREASED GFR: ICD-10-CM

## 2023-01-18 DIAGNOSIS — E11.9 TYPE 2 DIABETES MELLITUS WITHOUT COMPLICATION, WITHOUT LONG-TERM CURRENT USE OF INSULIN: ICD-10-CM

## 2023-01-18 DIAGNOSIS — Z09 FOLLOW-UP EXAM: Primary | ICD-10-CM

## 2023-01-18 DIAGNOSIS — E66.01 CLASS 2 SEVERE OBESITY DUE TO EXCESS CALORIES WITH SERIOUS COMORBIDITY AND BODY MASS INDEX (BMI) OF 36.0 TO 36.9 IN ADULT: ICD-10-CM

## 2023-01-18 DIAGNOSIS — M13.0 CHRONIC POLYARTHRITIS: ICD-10-CM

## 2023-01-18 LAB
ALBUMIN SERPL-MCNC: 4.1 G/DL (ref 3.5–5.2)
ALBUMIN/GLOB SERPL: 1.6 G/DL
ALP SERPL-CCNC: 50 U/L (ref 39–117)
ALT SERPL W P-5'-P-CCNC: 17 U/L (ref 1–33)
AMPHET+METHAMPHET UR QL: NEGATIVE
AMPHETAMINE INTERNAL CONTROL: NORMAL
AMPHETAMINES UR QL: NEGATIVE
ANION GAP SERPL CALCULATED.3IONS-SCNC: 10 MMOL/L (ref 5–15)
AST SERPL-CCNC: 14 U/L (ref 1–32)
BARBITURATE INTERNAL CONTROL: NORMAL
BARBITURATES UR QL SCN: NEGATIVE
BASOPHILS # BLD AUTO: 0.06 10*3/MM3 (ref 0–0.2)
BASOPHILS NFR BLD AUTO: 0.8 % (ref 0–1.5)
BENZODIAZ UR QL SCN: NEGATIVE
BENZODIAZEPINE INTERNAL CONTROL: NORMAL
BILIRUB SERPL-MCNC: 0.3 MG/DL (ref 0–1.2)
BUN SERPL-MCNC: 16 MG/DL (ref 8–23)
BUN/CREAT SERPL: 23.5 (ref 7–25)
BUPRENORPHINE INTERNAL CONTROL: NORMAL
BUPRENORPHINE SERPL-MCNC: NEGATIVE NG/ML
CALCIUM SPEC-SCNC: 9.6 MG/DL (ref 8.6–10.5)
CANNABINOIDS SERPL QL: NEGATIVE
CHLORIDE SERPL-SCNC: 104 MMOL/L (ref 98–107)
CHOLEST SERPL-MCNC: 185 MG/DL (ref 0–200)
CO2 SERPL-SCNC: 27 MMOL/L (ref 22–29)
COCAINE INTERNAL CONTROL: NORMAL
COCAINE UR QL: NEGATIVE
CREAT SERPL-MCNC: 0.68 MG/DL (ref 0.57–1)
DEPRECATED RDW RBC AUTO: 41.6 FL (ref 37–54)
EGFRCR SERPLBLD CKD-EPI 2021: 99.2 ML/MIN/1.73
EOSINOPHIL # BLD AUTO: 0.18 10*3/MM3 (ref 0–0.4)
EOSINOPHIL NFR BLD AUTO: 2.3 % (ref 0.3–6.2)
ERYTHROCYTE [DISTWIDTH] IN BLOOD BY AUTOMATED COUNT: 12.7 % (ref 12.3–15.4)
EXPIRATION DATE: NORMAL
GLOBULIN UR ELPH-MCNC: 2.5 GM/DL
GLUCOSE SERPL-MCNC: 78 MG/DL (ref 65–99)
HBA1C MFR BLD: 7.7 % (ref 4.8–5.6)
HCT VFR BLD AUTO: 44.4 % (ref 34–46.6)
HDLC SERPL-MCNC: 41 MG/DL (ref 40–60)
HGB BLD-MCNC: 14.6 G/DL (ref 12–15.9)
IMM GRANULOCYTES # BLD AUTO: 0.03 10*3/MM3 (ref 0–0.05)
IMM GRANULOCYTES NFR BLD AUTO: 0.4 % (ref 0–0.5)
LDLC SERPL CALC-MCNC: 122 MG/DL (ref 0–100)
LDLC/HDLC SERPL: 2.91 {RATIO}
LYMPHOCYTES # BLD AUTO: 3.77 10*3/MM3 (ref 0.7–3.1)
LYMPHOCYTES NFR BLD AUTO: 47.4 % (ref 19.6–45.3)
Lab: NORMAL
MCH RBC QN AUTO: 29.8 PG (ref 26.6–33)
MCHC RBC AUTO-ENTMCNC: 32.9 G/DL (ref 31.5–35.7)
MCV RBC AUTO: 90.6 FL (ref 79–97)
MDMA (ECSTASY) INTERNAL CONTROL: NORMAL
MDMA UR QL SCN: NEGATIVE
METHADONE INTERNAL CONTROL: NORMAL
METHADONE UR QL SCN: NEGATIVE
METHAMPHETAMINE INTERNAL CONTROL: NORMAL
MONOCYTES # BLD AUTO: 0.61 10*3/MM3 (ref 0.1–0.9)
MONOCYTES NFR BLD AUTO: 7.7 % (ref 5–12)
NEUTROPHILS NFR BLD AUTO: 3.3 10*3/MM3 (ref 1.7–7)
NEUTROPHILS NFR BLD AUTO: 41.4 % (ref 42.7–76)
NRBC BLD AUTO-RTO: 0 /100 WBC (ref 0–0.2)
OPIATES INTERNAL CONTROL: NORMAL
OPIATES UR QL: NEGATIVE
OXYCODONE INTERNAL CONTROL: NORMAL
OXYCODONE UR QL SCN: NEGATIVE
PCP UR QL SCN: NEGATIVE
PHENCYCLIDINE INTERNAL CONTROL: NORMAL
PHOSPHATE SERPL-MCNC: 4.2 MG/DL (ref 2.5–4.5)
PLATELET # BLD AUTO: 257 10*3/MM3 (ref 140–450)
PMV BLD AUTO: 11.8 FL (ref 6–12)
POTASSIUM SERPL-SCNC: 4.2 MMOL/L (ref 3.5–5.2)
PROT SERPL-MCNC: 6.6 G/DL (ref 6–8.5)
RBC # BLD AUTO: 4.9 10*6/MM3 (ref 3.77–5.28)
SODIUM SERPL-SCNC: 141 MMOL/L (ref 136–145)
THC INTERNAL CONTROL: NORMAL
TRIGL SERPL-MCNC: 124 MG/DL (ref 0–150)
VLDLC SERPL-MCNC: 22 MG/DL (ref 5–40)
WBC NRBC COR # BLD: 7.95 10*3/MM3 (ref 3.4–10.8)

## 2023-01-18 PROCEDURE — 83036 HEMOGLOBIN GLYCOSYLATED A1C: CPT | Performed by: NURSE PRACTITIONER

## 2023-01-18 PROCEDURE — 80305 DRUG TEST PRSMV DIR OPT OBS: CPT | Performed by: NURSE PRACTITIONER

## 2023-01-18 PROCEDURE — 80053 COMPREHEN METABOLIC PANEL: CPT | Performed by: NURSE PRACTITIONER

## 2023-01-18 PROCEDURE — 84100 ASSAY OF PHOSPHORUS: CPT | Performed by: NURSE PRACTITIONER

## 2023-01-18 PROCEDURE — 85025 COMPLETE CBC W/AUTO DIFF WBC: CPT | Performed by: NURSE PRACTITIONER

## 2023-01-18 PROCEDURE — 80061 LIPID PANEL: CPT | Performed by: NURSE PRACTITIONER

## 2023-01-18 PROCEDURE — 99214 OFFICE O/P EST MOD 30 MIN: CPT | Performed by: NURSE PRACTITIONER

## 2023-01-18 RX ORDER — GLYBURIDE 5 MG/1
5 TABLET ORAL 2 TIMES DAILY WITH MEALS
Qty: 180 TABLET | Refills: 1 | Status: SHIPPED | OUTPATIENT
Start: 2023-01-18

## 2023-01-18 RX ORDER — ORAL SEMAGLUTIDE 3 MG/1
3 TABLET ORAL DAILY
Qty: 90 TABLET | Refills: 0 | COMMUNITY
Start: 2023-01-18

## 2023-01-18 RX ORDER — ATORVASTATIN CALCIUM 80 MG/1
80 TABLET, FILM COATED ORAL DAILY
Qty: 90 TABLET | Refills: 1 | Status: SHIPPED | OUTPATIENT
Start: 2023-01-18

## 2023-01-18 NOTE — PROGRESS NOTES
Chief Complaint  Diabetes (2M follow up) and Med Refill    Subjective        Medical History: has a past medical history of Chronic pain, COPD (chronic obstructive pulmonary disease) (HCC), Diabetes mellitus (HCC), Hyperlipidemia, Hypertension, and Vitamin D deficiency.     Surgical History: has a past surgical history that includes Abdominal hysterectomy; Replacement total knee bilateral; Laparoscopic cholecystectomy; and Tonsillectomy.     Family History: family history includes Diabetes in her brother; Heart disease in her father, mother, and another family member; Nephrolithiasis in her brother; Other in her brother.     Social History: reports that she quit smoking about 16 months ago. Her smoking use included cigarettes. She started smoking about 43 years ago. She has a 20.00 pack-year smoking history. She has never used smokeless tobacco. Alcohol use questions deferred to the physician. She reports that she does not use drugs.    Adore Neves presents to [facility]  History of Present Illness  Diabetes  She presents for her follow-up diabetic visit. She has type 2 diabetes mellitus. Her disease course has been stable. There are no hypoglycemic associated symptoms. There are no diabetic associated symptoms. There are no hypoglycemic complications. Risk factors for coronary artery disease include diabetes mellitus, dyslipidemia, obesity, post-menopausal and hypertension. Current diabetic treatment includes oral agent (triple therapy). She is compliant with treatment all of the time. She is following a generally healthy diet. When asked about meal planning, she reported none. An ACE inhibitor/angiotensin II receptor blocker is being taken.     Patient comes in today for follow-up after being placed on Ozempic.  Last hemoglobin A1c was completed 10/19/2022 it was 7.4 at that time.  Objective   Vital Signs:   /58 (BP Location: Right arm, Patient Position: Sitting, Cuff  "Size: Adult)   Pulse 82   Temp 97.5 °F (36.4 °C) (Temporal)   Ht 162.6 cm (64\")   Wt 95.7 kg (211 lb)   SpO2 97%   BMI 36.22 kg/m²       Wt Readings from Last 3 Encounters:   01/18/23 95.7 kg (211 lb)   11/09/22 98 kg (216 lb)   10/19/22 98 kg (216 lb)        BP Readings from Last 3 Encounters:   01/18/23 126/58   10/19/22 128/78   08/05/22 147/84      Last Urine Toxicity     LAST URINE TOXICITY RESULTS Latest Ref Rng & Units 1/18/2023 4/20/2022    AMPHETAMINES SCREEN, URINE Negative Negative Negative    BARBITURATES SCREEN Negative Negative Negative    BENZODIAZEPINE SCREEN, URINE Negative Negative Negative    BUPRENORPHINEUR Negative Negative Negative    COCAINE SCREEN, URINE Negative Negative Negative    METHADONE SCREEN, URINE Negative Negative Negative    METHAMPHETAMINEUR Negative Negative Negative        Class 2 Severe Obesity (BMI >=35 and <=39.9). Obesity-related health conditions include the following: hypertension, diabetes mellitus and dyslipidemias. Obesity is improving with treatment. BMI is is above average; BMI management plan is completed. We discussed low calorie, low carb based diet program, portion control and increasing exercise.       Physical Exam  Vitals reviewed.   Constitutional:       Appearance: Normal appearance. She is well-developed. She is obese.   HENT:      Head: Normocephalic and atraumatic.   Eyes:      Conjunctiva/sclera: Conjunctivae normal.      Pupils: Pupils are equal, round, and reactive to light.   Cardiovascular:      Rate and Rhythm: Normal rate and regular rhythm.      Heart sounds: No murmur heard.  Pulmonary:      Effort: Pulmonary effort is normal.      Breath sounds: Normal breath sounds. No wheezing or rhonchi.   Skin:     General: Skin is warm and dry.   Neurological:      Mental Status: She is alert and oriented to person, place, and time.   Psychiatric:         Mood and Affect: Mood and affect normal.         Behavior: Behavior normal.         Thought " Content: Thought content normal.         Judgment: Judgment normal.        Result Review :  {The following data was reviewed by HUSAM Brooks on 01/18/2023.  Common labs    Common Labs 4/20/22 4/20/22 4/20/22 4/20/22 4/20/22 10/19/22 10/19/22 10/19/22 10/19/22    0815 0815 0815 0815 0815 0828 0828 0828 0828   Glucose    74   120 (A)     BUN    20   25 (A)     Creatinine    0.82   1.10 (A)     Sodium    141   140     Potassium    4.6   4.5     Chloride    101   101     Calcium    10.0   10.1     Albumin    4.30   4.60     Total Bilirubin    0.2   0.2     Alkaline Phosphatase    41   50     AST (SGOT)    17   21     ALT (SGPT)    17   21     WBC 9.63     10.66      Hemoglobin 15.2     15.9      Hematocrit 44.7     47.2 (A)      Platelets 232     244      Total Cholesterol     160   189    Triglycerides     170 (A)   196 (A)    HDL Cholesterol     41   49    LDL Cholesterol      90   106 (A)    Hemoglobin A1C  7.60 (A)       7.40 (A)   Microalbumin, Urine   <1.2         (A) Abnormal value            Data reviewed: Previous office note            Current Outpatient Medications on File Prior to Visit   Medication Sig Dispense Refill   • albuterol sulfate  (90 Base) MCG/ACT inhaler Inhale 2 puffs Every 4 (Four) Hours As Needed for Wheezing. 8.5 g 2   • ergocalciferol (ERGOCALCIFEROL) 1.25 MG (22024 UT) capsule Take 1 capsule by mouth 1 (One) Time Per Week. 13 capsule 0   • fenofibrate 160 MG tablet Take 1 tablet by mouth Daily. 90 tablet 1   • fexofenadine (ALLEGRA) 180 MG tablet Take 1 tablet by mouth Daily. 90 tablet 1   • fluticasone (FLONASE) 50 MCG/ACT nasal spray 2 sprays into the nostril(s) as directed by provider Daily. 16 g 1   • gabapentin (NEURONTIN) 600 MG tablet TAKE 1 TABLET 4 TIMES DAILY 360 tablet 1   • glucose blood test strip 1 each by Other route As Needed (USE 2 TIMES NEWBERRY UP TO 3 TIMES A DAY IF NEEDED). Use as instructed UP TO 3 TIMES A  each 2   • ipratropium-albuterol  (DUO-NEB) 0.5-2.5 mg/3 ml nebulizer Take 3 mL by nebulization Every 4 (Four) Hours As Needed for Wheezing. 360 mL 1   • Lancets misc 1 each 2 (Two) Times a Day As Needed (US 2 TIMES NEBWERRY UP TO 3 TIMES A DAY IF NEEDED). 200 each 2   • multivitamin with minerals (MULTIVITAMIN ADULT PO) Take 1 tablet by mouth Daily. 90 tablet 1   • traMADol (ULTRAM) 50 MG tablet Take 1 tablet by mouth Every 8 (Eight) Hours As Needed for Moderate Pain or Severe Pain. 120 tablet 1   • [DISCONTINUED] atorvastatin (LIPITOR) 80 MG tablet Take 1 tablet by mouth Daily. 90 tablet 1   • [DISCONTINUED] glyburide (DIAbeta) 5 MG tablet Take 1 tablet by mouth 2 (Two) Times a Day With Meals. 180 tablet 1   • [DISCONTINUED] metFORMIN (GLUCOPHAGE) 1000 MG tablet Take 1 tablet by mouth 2 (Two) Times a Day With Meals. 180 tablet 1   • Dulaglutide (Trulicity) 0.75 MG/0.5ML solution pen-injector Inject 0.75 mg under the skin into the appropriate area as directed 1 (One) Time Per Week for 90 days. 6 mL 1   • ibuprofen (ADVIL,MOTRIN) 600 MG tablet Take 600 mg by mouth Every 6 (Six) Hours As Needed. for pain     • metoprolol tartrate (LOPRESSOR) 25 MG tablet Take 1 tablet by mouth 2 (Two) Times a Day for 90 days. 180 tablet 1     No current facility-administered medications on file prior to visit.        Assessment and Plan  Diagnoses and all orders for this visit:    1. Follow-up exam (Primary)    2. Type 2 diabetes mellitus without complication, without long-term current use of insulin ([facility])  Comments:  Patient never started on Ozempic, patient given samples of Rybelsus in office and I need a 3-month follow-up after starting on medicine patient is agreeable  Orders:  -     CBC Auto Differential  -     Comprehensive Metabolic Panel  -     Hemoglobin A1c  -     Lipid Panel    3. Chronic polyarthritis  Comments:  UDS appropriate, patient contract up-to-date.  As of today.  Orders:  -     POC Urine Drug Screen [facility]    4. Medication management  -      POC Urine Drug Screen [facility]    5. Encounter for screening mammogram for malignant neoplasm of breast  Comments:  We will fax order down to [facility] patient to get mammogram done at [facility]  Orders:  -     Mammo Screening Digital Tomosynthesis Bilateral With CAD; Future  -     Mammo Screening Digital Tomosynthesis Bilateral With CAD  -     Mammo Screening Digital Tomosynthesis Bilateral With CAD    6. Class 2 severe obesity due to excess calories with serious comorbidity and body mass index (BMI) of 36.0 to 36.9 in adult (HCC)    7. Decreased GFR  -     Cancel: Renal Function Panel  -     Phosphorus    8. Encounter for screening mammogram for malignant neoplasm of breast  Comments:  Patient agreeable for mammogram ordered today.  Orders:  -     Mammo Screening Digital Tomosynthesis Bilateral With CAD; Future  -     Mammo Screening Digital Tomosynthesis Bilateral With CAD  -     Mammo Screening Digital Tomosynthesis Bilateral With CAD    Other orders  -     atorvastatin (LIPITOR) 80 MG tablet; Take 1 tablet by mouth Daily.  Dispense: 90 tablet; Refill: 1  -     glyburide (DIAbeta) 5 MG tablet; Take 1 tablet by mouth 2 (Two) Times a Day With Meals.  Dispense: 180 tablet; Refill: 1  -     metFORMIN (GLUCOPHAGE) 1000 MG tablet; Take 1 tablet by mouth 2 (Two) Times a Day With Meals.  Dispense: 180 tablet; Refill: 1  -     Semaglutide (Rybelsus) 3 MG tablet; Take 1 tablet by mouth Daily. Lot number [id number] Ex 10/23 X 3 30 day samples  Dispense: 90 tablet; Refill: 0        Follow Up   Return in about 3 months (around 4/18/2023) for Recheck, diabetes.  Patient was given instructions and counseling regarding her condition or for health maintenance advice. Please see specific information pulled into the AVS if appropriate.       Part of this note may be electronic transcription/translation of spoken language to printed text using the Dragon dictation system

## 2023-03-01 RX ORDER — FEXOFENADINE HYDROCHLORIDE 180 MG/1
TABLET, FILM COATED ORAL
Qty: 90 TABLET | Refills: 1 | Status: SHIPPED | OUTPATIENT
Start: 2023-03-01

## 2023-03-23 DIAGNOSIS — Z76.0 MEDICATION REFILL: ICD-10-CM

## 2023-03-23 RX ORDER — TRAMADOL HYDROCHLORIDE 50 MG/1
50 TABLET ORAL EVERY 8 HOURS PRN
Qty: 120 TABLET | Refills: 1 | OUTPATIENT
Start: 2023-03-23

## 2023-03-23 NOTE — TELEPHONE ENCOUNTER
Controlled Medication Refill Request:    1.  Last Office Visit:  1/18/2023     2.  Next Office Visit:  4/24/2023     3.  Last UDS Date:  1/18/2023    4.  Last Consent Signed:  1/18/2023    5.  Medication Requested: Tramadol    Pharmacy: [facility]

## 2023-03-26 DIAGNOSIS — Z76.0 MEDICATION REFILL: ICD-10-CM

## 2023-03-27 RX ORDER — TRAMADOL HYDROCHLORIDE 50 MG/1
TABLET ORAL
Qty: 120 TABLET | OUTPATIENT
Start: 2023-03-27

## 2023-03-28 DIAGNOSIS — Z76.0 MEDICATION REFILL: ICD-10-CM

## 2023-03-28 NOTE — TELEPHONE ENCOUNTER
Controlled Medication Refill Request:    1.  Last Office Visit:  01/18/2023     2.  Next Office Visit:  04/24/2023     3.  Last UDS Date:  POC Urine Drug Screen [facility] (01/18/2023 08:28)    4.  Last Consent Signed:  CONTROLLED SUBSTANCE AGREEMENT - SCAN - CONTROLLED RX/[facility]ATHANIEL/01/18/2023 (01/18/2023)    5.  Medication Requested: Tramadol    Pharmacy: [id number] - [place] [address] - [phone] [facility] [phone] FX  [phone]

## 2023-03-29 RX ORDER — TRAMADOL HYDROCHLORIDE 50 MG/1
50 TABLET ORAL EVERY 8 HOURS PRN
Qty: 120 TABLET | Refills: 1 | Status: SHIPPED | OUTPATIENT
Start: 2023-03-29

## 2023-04-12 DIAGNOSIS — Z76.0 MEDICATION REFILL: ICD-10-CM

## 2023-04-13 RX ORDER — TRAMADOL HYDROCHLORIDE 50 MG/1
50 TABLET ORAL EVERY 8 HOURS PRN
Qty: 120 TABLET | Refills: 1 | OUTPATIENT
Start: 2023-04-13

## 2023-04-24 ENCOUNTER — OFFICE VISIT (OUTPATIENT)
Dept: FAMILY MEDICINE CLINIC | Facility: CLINIC | Age: 63
End: 2023-04-24
Payer: COMMERCIAL

## 2023-04-24 VITALS
SYSTOLIC BLOOD PRESSURE: 122 MMHG | DIASTOLIC BLOOD PRESSURE: 64 MMHG | HEART RATE: 82 BPM | BODY MASS INDEX: 38.07 KG/M2 | TEMPERATURE: 97.5 F | HEIGHT: 64 IN | OXYGEN SATURATION: 98 % | WEIGHT: 223 LBS

## 2023-04-24 DIAGNOSIS — Z09 FOLLOW-UP EXAM, 3-6 MONTHS SINCE PREVIOUS EXAM: Primary | ICD-10-CM

## 2023-04-24 DIAGNOSIS — E11.65 TYPE 2 DIABETES MELLITUS WITH HYPERGLYCEMIA, WITHOUT LONG-TERM CURRENT USE OF INSULIN: ICD-10-CM

## 2023-04-24 DIAGNOSIS — G89.4 CHRONIC PAIN SYNDROME: ICD-10-CM

## 2023-04-24 DIAGNOSIS — E66.01 CLASS 2 SEVERE OBESITY WITH SERIOUS COMORBIDITY AND BODY MASS INDEX (BMI) OF 36.0 TO 36.9 IN ADULT, UNSPECIFIED OBESITY TYPE: ICD-10-CM

## 2023-04-24 LAB
ALBUMIN SERPL-MCNC: 4.3 G/DL (ref 3.5–5.2)
ALBUMIN UR-MCNC: <1.2 MG/DL
ALBUMIN/GLOB SERPL: 1.4 G/DL
ALP SERPL-CCNC: 46 U/L (ref 39–117)
ALT SERPL W P-5'-P-CCNC: 14 U/L (ref 1–33)
ANION GAP SERPL CALCULATED.3IONS-SCNC: 11.2 MMOL/L (ref 5–15)
AST SERPL-CCNC: 17 U/L (ref 1–32)
BASOPHILS # BLD AUTO: 0.08 10*3/MM3 (ref 0–0.2)
BASOPHILS NFR BLD AUTO: 0.9 % (ref 0–1.5)
BILIRUB SERPL-MCNC: 0.3 MG/DL (ref 0–1.2)
BUN SERPL-MCNC: 16 MG/DL (ref 8–23)
BUN/CREAT SERPL: 22.5 (ref 7–25)
CALCIUM SPEC-SCNC: 10.8 MG/DL (ref 8.6–10.5)
CHLORIDE SERPL-SCNC: 102 MMOL/L (ref 98–107)
CO2 SERPL-SCNC: 28.8 MMOL/L (ref 22–29)
CREAT SERPL-MCNC: 0.71 MG/DL (ref 0.57–1)
DEPRECATED RDW RBC AUTO: 40 FL (ref 37–54)
EGFRCR SERPLBLD CKD-EPI 2021: 96.9 ML/MIN/1.73
EOSINOPHIL # BLD AUTO: 0.23 10*3/MM3 (ref 0–0.4)
EOSINOPHIL NFR BLD AUTO: 2.5 % (ref 0.3–6.2)
ERYTHROCYTE [DISTWIDTH] IN BLOOD BY AUTOMATED COUNT: 12.4 % (ref 12.3–15.4)
GLOBULIN UR ELPH-MCNC: 3 GM/DL
GLUCOSE SERPL-MCNC: 99 MG/DL (ref 65–99)
HBA1C MFR BLD: 7.9 % (ref 4.8–5.6)
HCT VFR BLD AUTO: 44.3 % (ref 34–46.6)
HGB BLD-MCNC: 15.4 G/DL (ref 12–15.9)
IMM GRANULOCYTES # BLD AUTO: 0.06 10*3/MM3 (ref 0–0.05)
IMM GRANULOCYTES NFR BLD AUTO: 0.7 % (ref 0–0.5)
LYMPHOCYTES # BLD AUTO: 3.88 10*3/MM3 (ref 0.7–3.1)
LYMPHOCYTES NFR BLD AUTO: 42.1 % (ref 19.6–45.3)
MCH RBC QN AUTO: 31.3 PG (ref 26.6–33)
MCHC RBC AUTO-ENTMCNC: 34.8 G/DL (ref 31.5–35.7)
MCV RBC AUTO: 90 FL (ref 79–97)
MONOCYTES # BLD AUTO: 0.67 10*3/MM3 (ref 0.1–0.9)
MONOCYTES NFR BLD AUTO: 7.3 % (ref 5–12)
NEUTROPHILS NFR BLD AUTO: 4.29 10*3/MM3 (ref 1.7–7)
NEUTROPHILS NFR BLD AUTO: 46.5 % (ref 42.7–76)
NRBC BLD AUTO-RTO: 0.1 /100 WBC (ref 0–0.2)
PLATELET # BLD AUTO: 246 10*3/MM3 (ref 140–450)
PMV BLD AUTO: 11.3 FL (ref 6–12)
POTASSIUM SERPL-SCNC: 4.7 MMOL/L (ref 3.5–5.2)
PROT SERPL-MCNC: 7.3 G/DL (ref 6–8.5)
RBC # BLD AUTO: 4.92 10*6/MM3 (ref 3.77–5.28)
SODIUM SERPL-SCNC: 142 MMOL/L (ref 136–145)
WBC NRBC COR # BLD: 9.21 10*3/MM3 (ref 3.4–10.8)

## 2023-04-24 PROCEDURE — 82043 UR ALBUMIN QUANTITATIVE: CPT | Performed by: NURSE PRACTITIONER

## 2023-04-24 PROCEDURE — 83036 HEMOGLOBIN GLYCOSYLATED A1C: CPT | Performed by: NURSE PRACTITIONER

## 2023-04-24 PROCEDURE — 80053 COMPREHEN METABOLIC PANEL: CPT | Performed by: NURSE PRACTITIONER

## 2023-04-24 PROCEDURE — 85025 COMPLETE CBC W/AUTO DIFF WBC: CPT | Performed by: NURSE PRACTITIONER

## 2023-04-24 RX ORDER — GABAPENTIN 600 MG/1
600 TABLET ORAL 4 TIMES DAILY
Qty: 360 TABLET | Refills: 1 | Status: SHIPPED | OUTPATIENT
Start: 2023-04-24

## 2023-04-24 RX ORDER — ORAL SEMAGLUTIDE 3 MG/1
3 TABLET ORAL DAILY
Qty: 30 TABLET | Refills: 0 | COMMUNITY
Start: 2023-04-24

## 2023-04-24 RX ORDER — ORAL SEMAGLUTIDE 3 MG/1
3 TABLET ORAL DAILY
Qty: 90 TABLET | Refills: 1 | Status: SHIPPED | OUTPATIENT
Start: 2023-04-24 | End: 2023-04-24 | Stop reason: SDUPTHER

## 2023-04-24 NOTE — PROGRESS NOTES
Chief Complaint  Med Refill and Diabetes (3M - T2DM follow up)    Subjective        Medical History: has a past medical history of Chronic pain, COPD (chronic obstructive pulmonary disease), Diabetes mellitus, Hyperlipidemia, Hypertension, and Vitamin D deficiency.     Surgical History: has a past surgical history that includes Abdominal hysterectomy; Replacement total knee bilateral; Laparoscopic cholecystectomy; and Tonsillectomy.     Family History: family history includes Diabetes in her brother; Heart disease in her father, mother, and another family member; Nephrolithiasis in her brother; Other in her brother.     Social History: reports that she quit smoking about 19 months ago. Her smoking use included cigarettes. She started smoking about 43 years ago. She has a 20.00 pack-year smoking history. She has never used smokeless tobacco. Alcohol use questions deferred to the physician. She reports that she does not use drugs.    Adore Neves presents to [facility]  History of Present Illness  Diabetes  She presents for her follow-up diabetic visit. She has type 2 diabetes mellitus. Her disease course has been stable. There are no hypoglycemic associated symptoms. There are no diabetic associated symptoms. There are no hypoglycemic complications. Risk factors for coronary artery disease include diabetes mellitus, dyslipidemia, obesity, post-menopausal and hypertension. Current diabetic treatment includes oral agent (triple therapy). She is compliant with treatment all of the time. She is following a generally healthy diet. When asked about meal planning, she reported none. An ACE inhibitor/angiotensin II receptor blocker is being taken.      Patient comes in today for follow-up after being placed on Ozempic.  Last hemoglobin A1c was completed 01/18/2023 it was 7.7 at that time.  Objective   Vital Signs:   /64 (BP Location: Right arm, Patient Position: Sitting, Cuff Size:  "Adult)   Pulse 82   Temp 97.5 °F (36.4 °C) (Temporal)   Ht 162.6 cm (64\")   Wt 101 kg (223 lb)   SpO2 98%   BMI 38.28 kg/m²       Wt Readings from Last 3 Encounters:   04/24/23 101 kg (223 lb)   01/18/23 95.7 kg (211 lb)   11/09/22 98 kg (216 lb)        BP Readings from Last 3 Encounters:   04/24/23 122/64   01/18/23 126/58   10/19/22 128/78        Class 2 Severe Obesity (BMI >=35 and <=39.9). Obesity-related health conditions include the following: hypertension, coronary heart disease, diabetes mellitus and dyslipidemias. Obesity is improving with treatment. BMI is is above average; no BMI management plan is appropriate. We discussed low calorie, low carb based diet program, portion control and increasing exercise.       Physical Exam  Vitals reviewed.   Constitutional:       Appearance: Normal appearance. She is well-developed. She is obese.   HENT:      Head: Normocephalic and atraumatic.   Eyes:      Conjunctiva/sclera: Conjunctivae normal.      Pupils: Pupils are equal, round, and reactive to light.   Cardiovascular:      Rate and Rhythm: Normal rate and regular rhythm.      Heart sounds: No murmur heard.  Pulmonary:      Effort: Pulmonary effort is normal.      Breath sounds: Normal breath sounds. No wheezing or rhonchi.   Skin:     General: Skin is warm and dry.   Neurological:      Mental Status: She is alert and oriented to person, place, and time.   Psychiatric:         Mood and Affect: Mood and affect normal.         Behavior: Behavior normal.         Thought Content: Thought content normal.         Judgment: Judgment normal.        Result Review :  {The following data was reviewed by HUSAM Brooks on 04/24/2023.  Common labs        10/19/2022    08:28 1/18/2023    08:07   Common Labs   Glucose 120   78     BUN 25   16     Creatinine 1.10   0.68     Sodium 140   141     Potassium 4.5   4.2     Chloride 101   104     Calcium 10.1   9.6     Albumin 4.60   4.1     Total Bilirubin 0.2   0.3 "     Alkaline Phosphatase 50   50     AST (SGOT) 21   14     ALT (SGPT) 21   17     WBC 10.66   7.95     Hemoglobin 15.9   14.6     Hematocrit 47.2   44.4     Platelets 244   257     Total Cholesterol 189   185     Triglycerides 196   124     HDL Cholesterol 49   41     LDL Cholesterol  106   122     Hemoglobin A1C 7.40   7.70       Data reviewed: Previous office note          Last Urine Toxicity         Latest Ref Rng & Units 1/18/2023 4/20/2022   LAST URINE TOXICITY RESULTS   Amphetamine, Urine Qual Negative Negative   Negative     Barbiturates Screen, Urine Negative Negative   Negative     Benzodiazepine Screen, Urine Negative Negative   Negative     Buprenorphine, Screen, Urine Negative Negative   Negative     Cocaine Screen, Urine Negative Negative   Negative     Methadone Screen , Urine Negative Negative   Negative     Methamphetamine, Ur Negative Negative   Negative           Multiple values from one day are sorted in reverse-chronological order           Current Outpatient Medications on File Prior to Visit   Medication Sig Dispense Refill   • albuterol sulfate  (90 Base) MCG/ACT inhaler Inhale 2 puffs Every 4 (Four) Hours As Needed for Wheezing. 8.5 g 2   • Allergy Relief 180 MG tablet TAKE 1 TABLET BY MOUTH DAILY 90 tablet 1   • atorvastatin (LIPITOR) 80 MG tablet Take 1 tablet by mouth Daily. 90 tablet 1   • ergocalciferol (ERGOCALCIFEROL) 1.25 MG (99333 UT) capsule Take 1 capsule by mouth 1 (One) Time Per Week. 13 capsule 0   • fenofibrate 160 MG tablet Take 1 tablet by mouth Daily. 90 tablet 1   • glucose blood test strip 1 each by Other route As Needed (USE 2 TIMES NEWBERRY UP TO 3 TIMES A DAY IF NEEDED). Use as instructed UP TO 3 TIMES A  each 2   • glyburide (DIAbeta) 5 MG tablet Take 1 tablet by mouth 2 (Two) Times a Day With Meals. 180 tablet 1   • ipratropium-albuterol (DUO-NEB) 0.5-2.5 mg/3 ml nebulizer Take 3 mL by nebulization Every 4 (Four) Hours As Needed for Wheezing. 360 mL 1   •  Lancets misc 1 each 2 (Two) Times a Day As Needed (US 2 TIMES NEWBERRY UP TO 3 TIMES A DAY IF NEEDED). 200 each 2   • metFORMIN (GLUCOPHAGE) 1000 MG tablet Take 1 tablet by mouth 2 (Two) Times a Day With Meals. 180 tablet 1   • multivitamin with minerals (MULTIVITAMIN ADULT PO) Take 1 tablet by mouth Daily. 90 tablet 1   • traMADol (ULTRAM) 50 MG tablet Take 1 tablet by mouth Every 8 (Eight) Hours As Needed for Moderate Pain or Severe Pain. 120 tablet 1   • [DISCONTINUED] gabapentin (NEURONTIN) 600 MG tablet TAKE 1 TABLET 4 TIMES DAILY 360 tablet 1   • [DISCONTINUED] Semaglutide (Rybelsus) 3 MG tablet Take 1 tablet by mouth Daily. Lot number [id number] Ex 10/23 X 3 30 day samples 90 tablet 0   • fluticasone (FLONASE) 50 MCG/ACT nasal spray 2 sprays into the nostril(s) as directed by provider Daily. (Patient not taking: Reported on 4/24/2023) 16 g 1   • metoprolol tartrate (LOPRESSOR) 25 MG tablet Take 1 tablet by mouth 2 (Two) Times a Day for 90 days. 180 tablet 1     No current facility-administered medications on file prior to visit.        Assessment and Plan  Diagnoses and all orders for this visit:    1. Follow-up exam, 3-6 months since previous exam (Primary)    2. Type 2 diabetes mellitus with hyperglycemia, without long-term current use of insulin  -     CBC Auto Differential  -     Comprehensive Metabolic Panel  -     Hemoglobin A1c  -     MicroAlbumin, Urine, Random - Urine, Clean Catch    3. Chronic pain syndrome  Comments:  Patient needs refill of her gabapentin.  Orders:  -     gabapentin (NEURONTIN) 600 MG tablet; Take 1 tablet by mouth 4 (Four) Times a Day.  Dispense: 360 tablet; Refill: 1  -     Cancel: POC Urine Drug Screen [facility]    4. Class 2 severe obesity with serious comorbidity and body mass index (BMI) of 36.0 to 36.9 in adult, unspecified obesity type    Other orders  -     Discontinue: Semaglutide (Rybelsus) 3 MG tablet; Take 1 tablet by mouth Daily. Lot number [id number] Ex 10/23 X 3 30  day samples  Dispense: 90 tablet; Refill: 1  -     Semaglutide (Rybelsus) 3 MG tablet; Take 1 tablet by mouth Daily. Lot number [id number] Ex 10/23 X 3 30 day samples  Dispense: 30 tablet; Refill: 0        Follow Up   No follow-ups on file.  Patient was given instructions and counseling regarding her condition or for health maintenance advice. Please see specific information pulled into the AVS if appropriate.       Part of this note may be electronic transcription/translation of spoken language to printed text using the Dragon dictation system

## 2023-04-29 RX ORDER — ORAL SEMAGLUTIDE 7 MG/1
1 TABLET ORAL DAILY
Qty: 30 TABLET | Refills: 3 | Status: SHIPPED | OUTPATIENT
Start: 2023-04-29

## 2023-08-04 ENCOUNTER — OFFICE VISIT (OUTPATIENT)
Dept: FAMILY MEDICINE CLINIC | Facility: CLINIC | Age: 63
End: 2023-08-04
Payer: COMMERCIAL

## 2023-08-04 VITALS
HEIGHT: 64 IN | BODY MASS INDEX: 35.34 KG/M2 | WEIGHT: 207 LBS | OXYGEN SATURATION: 96 % | DIASTOLIC BLOOD PRESSURE: 62 MMHG | TEMPERATURE: 97.3 F | SYSTOLIC BLOOD PRESSURE: 142 MMHG | HEART RATE: 91 BPM

## 2023-08-04 DIAGNOSIS — G89.4 CHRONIC PAIN SYNDROME: ICD-10-CM

## 2023-08-04 DIAGNOSIS — R94.4 DECREASED GFR: ICD-10-CM

## 2023-08-04 DIAGNOSIS — E11.65 TYPE 2 DIABETES MELLITUS WITH HYPERGLYCEMIA, WITHOUT LONG-TERM CURRENT USE OF INSULIN: ICD-10-CM

## 2023-08-04 DIAGNOSIS — Z87.891 FORMER SMOKER, STOPPED SMOKING IN DISTANT PAST: ICD-10-CM

## 2023-08-04 DIAGNOSIS — Z09 FOLLOW-UP EXAM, 3-6 MONTHS SINCE PREVIOUS EXAM: Primary | ICD-10-CM

## 2023-08-04 DIAGNOSIS — E66.01 CLASS 2 SEVERE OBESITY DUE TO EXCESS CALORIES WITH SERIOUS COMORBIDITY AND BODY MASS INDEX (BMI) OF 35.0 TO 35.9 IN ADULT: ICD-10-CM

## 2023-08-04 LAB
ALBUMIN SERPL-MCNC: 4.2 G/DL (ref 3.5–5.2)
ALBUMIN UR-MCNC: 2.6 MG/DL
ALBUMIN/GLOB SERPL: 1.6 G/DL
ALP SERPL-CCNC: 53 U/L (ref 39–117)
ALT SERPL W P-5'-P-CCNC: 22 U/L (ref 1–33)
ANION GAP SERPL CALCULATED.3IONS-SCNC: 10.8 MMOL/L (ref 5–15)
AST SERPL-CCNC: 23 U/L (ref 1–32)
BASOPHILS # BLD AUTO: 0.06 10*3/MM3 (ref 0–0.2)
BASOPHILS NFR BLD AUTO: 0.6 % (ref 0–1.5)
BILIRUB SERPL-MCNC: 0.3 MG/DL (ref 0–1.2)
BUN SERPL-MCNC: 17 MG/DL (ref 8–23)
BUN/CREAT SERPL: 16 (ref 7–25)
CALCIUM SPEC-SCNC: 10 MG/DL (ref 8.6–10.5)
CHLORIDE SERPL-SCNC: 105 MMOL/L (ref 98–107)
CHOLEST SERPL-MCNC: 139 MG/DL (ref 0–200)
CO2 SERPL-SCNC: 27.2 MMOL/L (ref 22–29)
CREAT SERPL-MCNC: 1.06 MG/DL (ref 0.57–1)
DEPRECATED RDW RBC AUTO: 42.6 FL (ref 37–54)
EGFRCR SERPLBLD CKD-EPI 2021: 59.5 ML/MIN/1.73
EOSINOPHIL # BLD AUTO: 0.14 10*3/MM3 (ref 0–0.4)
EOSINOPHIL NFR BLD AUTO: 1.4 % (ref 0.3–6.2)
ERYTHROCYTE [DISTWIDTH] IN BLOOD BY AUTOMATED COUNT: 12.9 % (ref 12.3–15.4)
GLOBULIN UR ELPH-MCNC: 2.7 GM/DL
GLUCOSE SERPL-MCNC: 117 MG/DL (ref 65–99)
HBA1C MFR BLD: 8.1 % (ref 4.8–5.6)
HCT VFR BLD AUTO: 42.5 % (ref 34–46.6)
HDLC SERPL-MCNC: 46 MG/DL (ref 40–60)
HGB BLD-MCNC: 14.3 G/DL (ref 12–15.9)
IMM GRANULOCYTES # BLD AUTO: 0.05 10*3/MM3 (ref 0–0.05)
IMM GRANULOCYTES NFR BLD AUTO: 0.5 % (ref 0–0.5)
LDLC SERPL CALC-MCNC: 67 MG/DL (ref 0–100)
LDLC/HDLC SERPL: 1.37 {RATIO}
LYMPHOCYTES # BLD AUTO: 4.61 10*3/MM3 (ref 0.7–3.1)
LYMPHOCYTES NFR BLD AUTO: 45.8 % (ref 19.6–45.3)
MCH RBC QN AUTO: 31 PG (ref 26.6–33)
MCHC RBC AUTO-ENTMCNC: 33.6 G/DL (ref 31.5–35.7)
MCV RBC AUTO: 92.2 FL (ref 79–97)
MONOCYTES # BLD AUTO: 0.74 10*3/MM3 (ref 0.1–0.9)
MONOCYTES NFR BLD AUTO: 7.4 % (ref 5–12)
NEUTROPHILS NFR BLD AUTO: 4.46 10*3/MM3 (ref 1.7–7)
NEUTROPHILS NFR BLD AUTO: 44.3 % (ref 42.7–76)
NRBC BLD AUTO-RTO: 0 /100 WBC (ref 0–0.2)
PLATELET # BLD AUTO: 258 10*3/MM3 (ref 140–450)
PMV BLD AUTO: 12.1 FL (ref 6–12)
POTASSIUM SERPL-SCNC: 4.4 MMOL/L (ref 3.5–5.2)
PROT SERPL-MCNC: 6.9 G/DL (ref 6–8.5)
RBC # BLD AUTO: 4.61 10*6/MM3 (ref 3.77–5.28)
SODIUM SERPL-SCNC: 143 MMOL/L (ref 136–145)
TRIGL SERPL-MCNC: 150 MG/DL (ref 0–150)
TSH SERPL DL<=0.05 MIU/L-ACNC: 1.17 UIU/ML (ref 0.27–4.2)
VLDLC SERPL-MCNC: 26 MG/DL (ref 5–40)
WBC NRBC COR # BLD: 10.06 10*3/MM3 (ref 3.4–10.8)

## 2023-08-04 PROCEDURE — 83036 HEMOGLOBIN GLYCOSYLATED A1C: CPT | Performed by: NURSE PRACTITIONER

## 2023-08-04 PROCEDURE — 80061 LIPID PANEL: CPT | Performed by: NURSE PRACTITIONER

## 2023-08-04 PROCEDURE — 80050 GENERAL HEALTH PANEL: CPT | Performed by: NURSE PRACTITIONER

## 2023-08-04 PROCEDURE — 82043 UR ALBUMIN QUANTITATIVE: CPT | Performed by: NURSE PRACTITIONER

## 2023-08-04 RX ORDER — ORAL SEMAGLUTIDE 3 MG/1
3 TABLET ORAL DAILY
Qty: 30 TABLET | Refills: 0 | COMMUNITY
Start: 2023-08-04

## 2023-08-04 RX ORDER — GABAPENTIN 600 MG/1
600 TABLET ORAL 4 TIMES DAILY
Qty: 360 TABLET | Refills: 1 | Status: SHIPPED | OUTPATIENT
Start: 2023-08-04

## 2023-08-04 RX ORDER — DULAGLUTIDE 1.5 MG/.5ML
1.5 INJECTION, SOLUTION SUBCUTANEOUS WEEKLY
Qty: 6 ML | Refills: 1 | Status: SHIPPED | OUTPATIENT
Start: 2023-08-04

## 2023-08-04 RX ORDER — DULAGLUTIDE 0.75 MG/.5ML
0.75 INJECTION, SOLUTION SUBCUTANEOUS WEEKLY
Qty: 2 ML | Refills: 0 | Status: SHIPPED | OUTPATIENT
Start: 2023-08-04

## 2023-09-07 DIAGNOSIS — Z76.0 MEDICATION REFILL: ICD-10-CM

## 2023-09-08 RX ORDER — TRAMADOL HYDROCHLORIDE 50 MG/1
50 TABLET ORAL EVERY 8 HOURS PRN
Qty: 120 TABLET | Refills: 1 | Status: SHIPPED | OUTPATIENT
Start: 2023-09-08

## 2023-09-08 RX ORDER — FEXOFENADINE HCL 180 MG/1
180 TABLET ORAL DAILY
Qty: 90 TABLET | Refills: 1 | Status: SHIPPED | OUTPATIENT
Start: 2023-09-08

## 2023-09-13 DIAGNOSIS — G89.4 CHRONIC PAIN SYNDROME: ICD-10-CM

## 2023-09-13 RX ORDER — GABAPENTIN 600 MG/1
TABLET ORAL
Qty: 360 TABLET | Refills: 1 | Status: SHIPPED | OUTPATIENT
Start: 2023-09-13

## 2023-11-28 RX ORDER — GLYBURIDE 5 MG/1
5 TABLET ORAL 2 TIMES DAILY WITH MEALS
Qty: 180 TABLET | Refills: 1 | Status: SHIPPED | OUTPATIENT
Start: 2023-11-28

## 2023-11-28 RX ORDER — ERGOCALCIFEROL 1.25 MG/1
1 CAPSULE ORAL WEEKLY
Qty: 13 CAPSULE | Refills: 0 | Status: SHIPPED | OUTPATIENT
Start: 2023-11-28

## 2023-11-28 RX ORDER — FENOFIBRATE 160 MG/1
160 TABLET ORAL DAILY
Qty: 90 TABLET | Refills: 1 | Status: SHIPPED | OUTPATIENT
Start: 2023-11-28

## 2023-12-04 DIAGNOSIS — E11.9 TYPE 2 DIABETES MELLITUS WITHOUT COMPLICATION, WITHOUT LONG-TERM CURRENT USE OF INSULIN: ICD-10-CM

## 2023-12-04 RX ORDER — LANCETS 30 GAUGE
1 EACH MISCELLANEOUS 2 TIMES DAILY PRN
Qty: 200 EACH | Refills: 2 | Status: SHIPPED | OUTPATIENT
Start: 2023-12-04

## 2023-12-18 DIAGNOSIS — Z76.0 MEDICATION REFILL: ICD-10-CM

## 2023-12-18 RX ORDER — TRAMADOL HYDROCHLORIDE 50 MG/1
50 TABLET ORAL EVERY 8 HOURS PRN
Qty: 120 TABLET | Refills: 1 | Status: SHIPPED | OUTPATIENT
Start: 2023-12-18

## 2024-01-10 ENCOUNTER — OFFICE VISIT (OUTPATIENT)
Dept: FAMILY MEDICINE CLINIC | Facility: CLINIC | Age: 64
End: 2024-01-10
Payer: COMMERCIAL

## 2024-01-10 VITALS
OXYGEN SATURATION: 95 % | TEMPERATURE: 98.1 F | SYSTOLIC BLOOD PRESSURE: 126 MMHG | DIASTOLIC BLOOD PRESSURE: 68 MMHG | BODY MASS INDEX: 35.34 KG/M2 | HEART RATE: 96 BPM | WEIGHT: 207 LBS | HEIGHT: 64 IN

## 2024-01-10 DIAGNOSIS — Z23 NEED FOR INFLUENZA VACCINATION: ICD-10-CM

## 2024-01-10 DIAGNOSIS — Z00.00 ANNUAL PHYSICAL EXAM: Primary | ICD-10-CM

## 2024-01-10 DIAGNOSIS — E03.9 ACQUIRED HYPOTHYROIDISM: ICD-10-CM

## 2024-01-10 DIAGNOSIS — E11.65 TYPE 2 DIABETES MELLITUS WITH HYPERGLYCEMIA, WITHOUT LONG-TERM CURRENT USE OF INSULIN: ICD-10-CM

## 2024-01-10 DIAGNOSIS — I10 ESSENTIAL HYPERTENSION: ICD-10-CM

## 2024-01-10 DIAGNOSIS — Z87.891 FORMER SMOKER, STOPPED SMOKING IN DISTANT PAST: ICD-10-CM

## 2024-01-10 DIAGNOSIS — E55.9 VITAMIN D DEFICIENCY: ICD-10-CM

## 2024-01-10 DIAGNOSIS — E78.2 MIXED HYPERLIPIDEMIA: ICD-10-CM

## 2024-01-10 DIAGNOSIS — E66.01 CLASS 2 SEVERE OBESITY DUE TO EXCESS CALORIES WITH SERIOUS COMORBIDITY AND BODY MASS INDEX (BMI) OF 35.0 TO 35.9 IN ADULT: ICD-10-CM

## 2024-01-10 DIAGNOSIS — G89.4 CHRONIC PAIN SYNDROME: ICD-10-CM

## 2024-01-10 LAB
25(OH)D3 SERPL-MCNC: 44.5 NG/ML (ref 30–100)
ALBUMIN SERPL-MCNC: 4.5 G/DL (ref 3.5–5.2)
ALBUMIN UR-MCNC: 6.6 MG/DL
ALBUMIN/GLOB SERPL: 1.5 G/DL
ALP SERPL-CCNC: 68 U/L (ref 39–117)
ALT SERPL W P-5'-P-CCNC: 18 U/L (ref 1–33)
ANION GAP SERPL CALCULATED.3IONS-SCNC: 13.7 MMOL/L (ref 5–15)
AST SERPL-CCNC: 18 U/L (ref 1–32)
BASOPHILS # BLD AUTO: 0.07 10*3/MM3 (ref 0–0.2)
BASOPHILS NFR BLD AUTO: 0.6 % (ref 0–1.5)
BILIRUB SERPL-MCNC: 0.2 MG/DL (ref 0–1.2)
BUN SERPL-MCNC: 18 MG/DL (ref 8–23)
BUN/CREAT SERPL: 22.5 (ref 7–25)
CALCIUM SPEC-SCNC: 10.3 MG/DL (ref 8.6–10.5)
CHLORIDE SERPL-SCNC: 99 MMOL/L (ref 98–107)
CHOLEST SERPL-MCNC: 274 MG/DL (ref 0–200)
CO2 SERPL-SCNC: 26.3 MMOL/L (ref 22–29)
CREAT SERPL-MCNC: 0.8 MG/DL (ref 0.57–1)
DEPRECATED RDW RBC AUTO: 42.5 FL (ref 37–54)
EGFRCR SERPLBLD CKD-EPI 2021: 83.4 ML/MIN/1.73
EOSINOPHIL # BLD AUTO: 0.19 10*3/MM3 (ref 0–0.4)
EOSINOPHIL NFR BLD AUTO: 1.7 % (ref 0.3–6.2)
ERYTHROCYTE [DISTWIDTH] IN BLOOD BY AUTOMATED COUNT: 12.5 % (ref 12.3–15.4)
GLOBULIN UR ELPH-MCNC: 3.1 GM/DL
GLUCOSE SERPL-MCNC: 136 MG/DL (ref 65–99)
HBA1C MFR BLD: 9.4 % (ref 4.8–5.6)
HCT VFR BLD AUTO: 50.4 % (ref 34–46.6)
HDLC SERPL-MCNC: 46 MG/DL (ref 40–60)
HGB BLD-MCNC: 17.1 G/DL (ref 12–15.9)
IMM GRANULOCYTES # BLD AUTO: 0.05 10*3/MM3 (ref 0–0.05)
IMM GRANULOCYTES NFR BLD AUTO: 0.5 % (ref 0–0.5)
LDLC SERPL CALC-MCNC: 153 MG/DL (ref 0–100)
LDLC/HDLC SERPL: 3.23 {RATIO}
LYMPHOCYTES # BLD AUTO: 4.08 10*3/MM3 (ref 0.7–3.1)
LYMPHOCYTES NFR BLD AUTO: 37.2 % (ref 19.6–45.3)
MCH RBC QN AUTO: 31 PG (ref 26.6–33)
MCHC RBC AUTO-ENTMCNC: 33.9 G/DL (ref 31.5–35.7)
MCV RBC AUTO: 91.5 FL (ref 79–97)
MONOCYTES # BLD AUTO: 0.8 10*3/MM3 (ref 0.1–0.9)
MONOCYTES NFR BLD AUTO: 7.3 % (ref 5–12)
NEUTROPHILS NFR BLD AUTO: 5.77 10*3/MM3 (ref 1.7–7)
NEUTROPHILS NFR BLD AUTO: 52.7 % (ref 42.7–76)
NRBC BLD AUTO-RTO: 0 /100 WBC (ref 0–0.2)
PLATELET # BLD AUTO: 226 10*3/MM3 (ref 140–450)
PMV BLD AUTO: 11.7 FL (ref 6–12)
POTASSIUM SERPL-SCNC: 4.8 MMOL/L (ref 3.5–5.2)
PROT SERPL-MCNC: 7.6 G/DL (ref 6–8.5)
RBC # BLD AUTO: 5.51 10*6/MM3 (ref 3.77–5.28)
SODIUM SERPL-SCNC: 139 MMOL/L (ref 136–145)
T-UPTAKE NFR SERPL: 1.05 TBI (ref 0.8–1.3)
T4 SERPL-MCNC: 8.99 MCG/DL (ref 4.5–11.7)
TRIGL SERPL-MCNC: 398 MG/DL (ref 0–150)
TSH SERPL DL<=0.05 MIU/L-ACNC: 0.91 UIU/ML (ref 0.27–4.2)
VLDLC SERPL-MCNC: 75 MG/DL (ref 5–40)
WBC NRBC COR # BLD AUTO: 10.96 10*3/MM3 (ref 3.4–10.8)

## 2024-01-10 PROCEDURE — 80061 LIPID PANEL: CPT | Performed by: NURSE PRACTITIONER

## 2024-01-10 PROCEDURE — 82306 VITAMIN D 25 HYDROXY: CPT | Performed by: NURSE PRACTITIONER

## 2024-01-10 PROCEDURE — 84479 ASSAY OF THYROID (T3 OR T4): CPT | Performed by: NURSE PRACTITIONER

## 2024-01-10 PROCEDURE — 80050 GENERAL HEALTH PANEL: CPT | Performed by: NURSE PRACTITIONER

## 2024-01-10 PROCEDURE — 83036 HEMOGLOBIN GLYCOSYLATED A1C: CPT | Performed by: NURSE PRACTITIONER

## 2024-01-10 PROCEDURE — 84436 ASSAY OF TOTAL THYROXINE: CPT | Performed by: NURSE PRACTITIONER

## 2024-01-10 PROCEDURE — 82043 UR ALBUMIN QUANTITATIVE: CPT | Performed by: NURSE PRACTITIONER

## 2024-01-10 RX ORDER — GABAPENTIN 600 MG/1
600 TABLET ORAL 4 TIMES DAILY
Qty: 28 TABLET | Refills: 0 | Status: SHIPPED | OUTPATIENT
Start: 2024-01-10 | End: 2024-01-17

## 2024-01-10 NOTE — PROGRESS NOTES
"  ENCOUNTER DATE:  01/10/2024    Adore Neves / 63 y.o. / female      CHIEF COMPLAINT     Diabetes (T2DM)      VITALS     Visit Vitals  /68 (BP Location: Left arm, Patient Position: Sitting, Cuff Size: Adult)   Pulse 96   Temp 98.1 °F (36.7 °C) (Oral)   Ht 162.6 cm (64\")   Wt 93.9 kg (207 lb)   SpO2 95%   BMI 35.53 kg/m²       BP Readings from Last 3 Encounters:   01/10/24 126/68   08/04/23 142/62   04/24/23 122/64     Wt Readings from Last 3 Encounters:   01/10/24 93.9 kg (207 lb)   08/04/23 93.9 kg (207 lb)   04/24/23 101 kg (223 lb)      Body mass index is 35.53 kg/m².    MEDICATIONS     Current Outpatient Medications on File Prior to Visit   Medication Sig Dispense Refill    albuterol sulfate  (90 Base) MCG/ACT inhaler Inhale 2 puffs Every 4 (Four) Hours As Needed for Wheezing. 8.5 g 2    atorvastatin (LIPITOR) 80 MG tablet Take 1 tablet by mouth Daily. 90 tablet 1    ergocalciferol (ERGOCALCIFEROL) 1.25 MG (12729 UT) capsule Take 1 capsule by mouth 1 (One) Time Per Week. 13 capsule 0    fenofibrate 160 MG tablet Take 1 tablet by mouth Daily. 90 tablet 1    fexofenadine (Allergy Relief) 180 MG tablet Take 1 tablet by mouth Daily. 90 tablet 1    glucose blood test strip 1 each by Other route As Needed (USE 2 TIMES NEWBERRY UP TO 3 TIMES A DAY IF NEEDED). Use as instructed UP TO 3 TIMES A  each 2    glyburide (DIAbeta) 5 MG tablet Take 1 tablet by mouth 2 (Two) Times a Day With Meals. 180 tablet 1    Lancets misc Use 1 each 2 (Two) Times a Day As Needed (US 2 TIMES NEWBERRY UP TO 3 TIMES A DAY IF NEEDED). 200 each 2    metFORMIN (GLUCOPHAGE) 1000 MG tablet TAKE 1 TABLET TWICE DAILY  WITH MEALS 180 tablet 1    metoprolol tartrate (LOPRESSOR) 25 MG tablet Take 1 tablet by mouth 2 (Two) Times a Day for 90 days. 180 tablet 1    multivitamin with minerals (MULTIVITAMIN ADULT PO) Take 1 tablet by mouth Daily. 90 tablet 1    traMADol (ULTRAM) 50 MG tablet Take 1 tablet by mouth Every 8 (Eight) Hours " "As Needed for Moderate Pain or Severe Pain. 120 tablet 1    [DISCONTINUED] gabapentin (NEURONTIN) 600 MG tablet TAKE 1 TABLET 4 TIMES DAILY 360 tablet 1    [DISCONTINUED] Dulaglutide (Trulicity) 0.75 MG/0.5ML solution pen-injector Inject 0.75 mg under the skin into the appropriate area as directed 1 (One) Time Per Week. (Patient not taking: Reported on 1/10/2024) 2 mL 0    [DISCONTINUED] Dulaglutide (Trulicity) 1.5 MG/0.5ML solution pen-injector Inject 1.5 mg under the skin into the appropriate area as directed 1 (One) Time Per Week. (Patient not taking: Reported on 1/10/2024) 6 mL 1    [DISCONTINUED] Semaglutide (Rybelsus) 3 MG tablet Take 1 tablet by mouth Daily. Lot number [id number] Ex 10/23 X 3 30 day samples (Patient not taking: Reported on 1/10/2024) 30 tablet 0     No current facility-administered medications on file prior to visit.         HISTORY OF PRESENT ILLNESS     Adore presents for annual health maintenance visit.  No results found for: \"HPVAPTIMA\"   Last health maintenance visit: approximately 1 year ago  General health: some medical problems  Lifestyle:  Attempting to lose weight?: Yes   Diet: eats a well balanced, healthy diet  Exercise: generally sedentary  Tobacco: Former smoker   Alcohol: does not drink  Work: Full-time  Reproductive health:  Sexually active?: No   Sexual problems?: No problems  Concern for STD?: No    Sees Gynecologist?: No   Annette/Postmenopausal?: Yes   Domestic abuse concerns: No   Depression Screening:          PHQ-9 Depression Screening  Little interest or pleasure in doing things? 0-->not at all   Feeling down, depressed, or hopeless? 0-->not at all   Trouble falling or staying asleep, or sleeping too much?     Feeling tired or having little energy?     Poor appetite or overeating?     Feeling bad about yourself - or that you are a failure or have let yourself or your family down?     Trouble concentrating on things, such as reading the newspaper or watching television?   "   Moving or speaking so slowly that other people could have noticed? Or the opposite - being so fidgety or restless that you have been moving around a lot more than usual?     Thoughts that you would be better off dead, or of hurting yourself in some way?     PHQ-9 Total Score 0   If you checked off any problems, how difficult have these problems made it for you to do your work, take care of things at home, or get along with other people?           MAKAYLA-7           Patient Care Team:  Kiesha Evans APRN as PCP - General (Nurse Practitioner)      ALLERGIES  No Known Allergies     PFSH:     The following portions of the patient's history were reviewed and updated as appropriate: Allergies / Current Medications / Past Medical History / Surgical History / Social History / Family History    PROBLEM LIST   Patient Active Problem List   Diagnosis    Arthritis       PAST MEDICAL HISTORY  Past Medical History:   Diagnosis Date    Chronic pain     COPD (chronic obstructive pulmonary disease)     Diabetes mellitus     Hyperlipidemia     Hypertension     Vitamin D deficiency        SURGICAL HISTORY  Past Surgical History:   Procedure Laterality Date    ABDOMINAL HYSTERECTOMY      LAPAROSCOPIC CHOLECYSTECTOMY      REPLACEMENT TOTAL KNEE BILATERAL      TONSILLECTOMY         SOCIAL HISTORY  Social History     Socioeconomic History    Marital status:    Tobacco Use    Smoking status: Former     Packs/day: 0.50     Years: 40.00     Additional pack years: 0.00     Total pack years: 20.00     Types: Cigarettes     Start date:      Quit date: 2021     Years since quittin.3    Smokeless tobacco: Never    Tobacco comments:     SOME SECOND HAND SMOKE EXPOSURE   Vaping Use    Vaping Use: Never used   Substance and Sexual Activity    Alcohol use: Defer    Drug use: Never    Sexual activity: Defer       FAMILY HISTORY  Family History   Problem Relation Age of Onset    Heart disease Mother     Heart disease Father      Diabetes Brother     Nephrolithiasis Brother     Other Brother         BLADDER CALCULUS    Heart disease Other        IMMUNIZATION HISTORY  Immunization History   Administered Date(s) Administered    Fluzone (or Fluarix & Flulaval for VFC) >6mos 01/20/2022, 10/19/2022, 01/10/2024    Influenza, Unspecified 10/07/2020    Pneumococcal Conjugate 13-Valent (PCV13) 01/15/2020    Pneumococcal Conjugate 20-Valent (PCV20) 10/19/2022         HPI  Diabetes  She presents for her follow-up diabetic visit. She has type 2 diabetes mellitus. Her disease course has been stable. There are no hypoglycemic associated symptoms. There are no diabetic associated symptoms. There are no hypoglycemic complications. Risk factors for coronary artery disease include diabetes mellitus, dyslipidemia, obesity, post-menopausal and hypertension. Current diabetic treatment includes oral agent (triple therapy). She is compliant with treatment all of the time. She is following a generally healthy diet. When asked about meal planning, she reported none. An ACE inhibitor/angiotensin II receptor blocker is being taken.   Hypertension  This is a chronic problem. The current episode started more than 1 year ago. Pertinent negatives include no anxiety, peripheral edema or shortness of breath. Risk factors for coronary artery disease include dyslipidemia, diabetes mellitus, post-menopausal state and obesity. Past treatments include beta blockers. Current antihypertension treatment includes beta blockers. The current treatment provides significant improvement. There are no compliance problems.    Hyperlipidemia  This is a chronic problem. The current episode started more than 1 year ago. Exacerbating diseases include diabetes and obesity. Pertinent negatives include no shortness of breath. Current antihyperlipidemic treatment includes statins. The current treatment provides moderate improvement of lipids. Risk factors for coronary artery disease include  diabetes mellitus, family history, dyslipidemia, hypertension, obesity and post-menopausal.      Patient has not been taking the Rybelsus to the Trulicity, apparently insurance will not be longer.  Office was not notified of this until this morning's appointment.  Will check A1c and adjust medication accordingly.  Patient verbalized understanding.    Physical Exam  Vitals reviewed.   Constitutional:       General: She is not in acute distress.     Appearance: Normal appearance. She is well-developed. She is not ill-appearing.   HENT:      Head: Normocephalic and atraumatic.      Right Ear: External ear normal.      Left Ear: External ear normal.   Eyes:      Conjunctiva/sclera: Conjunctivae normal.      Pupils: Pupils are equal, round, and reactive to light.   Cardiovascular:      Rate and Rhythm: Normal rate and regular rhythm.      Heart sounds: No murmur heard.  Pulmonary:      Effort: Pulmonary effort is normal.      Breath sounds: Normal breath sounds. No wheezing or rhonchi.   Musculoskeletal:      Right lower leg: No edema.      Left lower leg: No edema.   Skin:     General: Skin is warm and dry.   Neurological:      Mental Status: She is alert and oriented to person, place, and time.   Psychiatric:         Mood and Affect: Mood and affect normal.         Behavior: Behavior normal.         Thought Content: Thought content normal.         Judgment: Judgment normal.         REVIEWED DATA      Labs:    Lab Results   Component Value Date     08/04/2023    K 4.4 08/04/2023    CALCIUM 10.0 08/04/2023    AST 23 08/04/2023    ALT 22 08/04/2023    BUN 17 08/04/2023    CREATININE 1.06 (H) 08/04/2023    CREATININE 0.71 04/24/2023    CREATININE 0.68 01/18/2023    EGFRIFNONA 47 (L) 01/20/2022       Lab Results   Component Value Date    HGBA1C 8.10 (H) 08/04/2023    HGBA1C 7.90 (H) 04/24/2023    HGBA1C 7.70 (H) 01/18/2023    TSH 1.170 08/04/2023    FREET4 1.4 03/26/2021       Lab Results   Component Value Date    LDL  "67 08/04/2023    HDL 46 08/04/2023    TRIG 150 08/04/2023    CHOLHDLRATIO 2.6 (L) 03/26/2021       Lab Results   Component Value Date    MYDC19IO 42.8 10/19/2022        Lab Results   Component Value Date    WBC 10.06 08/04/2023    HGB 14.3 08/04/2023    MCV 92.2 08/04/2023     08/04/2023       No results found for: \"PROTEIN\", \"GLUCOSEU\", \"BLOODU\", \"NITRITEU\", \"LEUKOCYTESUR\"     No results found for: \"HEPCVIRUSABY\"        ASSESSMENT & PLAN     Diagnoses and all orders for this visit:    1. Annual physical exam (Primary)    2. Type 2 diabetes mellitus with hyperglycemia, without long-term current use of insulin  -     CBC Auto Differential  -     Comprehensive Metabolic Panel  -     Hemoglobin A1c  -     Lipid Panel  -     MicroAlbumin, Urine, Random - Urine, Clean Catch    3. Class 2 severe obesity due to excess calories with serious comorbidity and body mass index (BMI) of 35.0 to 35.9 in adult    4. Former smoker, stopped smoking in distant past    5. Mixed hyperlipidemia  -     CBC Auto Differential  -     Comprehensive Metabolic Panel  -     Lipid Panel    6. Essential hypertension  Comments:  Blood pressure stable at 126/68 we will continue on current medication regimen  Orders:  -     CBC Auto Differential  -     Comprehensive Metabolic Panel  -     MicroAlbumin, Urine, Random - Urine, Clean Catch    7. Vitamin D deficiency  -     Vitamin D,25-Hydroxy    8. Acquired hypothyroidism  -     Thyroid Panel With TSH    9. Need for influenza vaccination  -     Fluzone (or Fluarix & Flulaval for VFC) >6mos    10. Chronic pain syndrome  Comments:  Patient needs refill of her gabapentin.  Orders:  -     gabapentin (NEURONTIN) 600 MG tablet; Take 1 tablet by mouth 4 (Four) Times a Day for 7 days.  Dispense: 28 tablet; Refill: 0        HEALTHCARE MAINTENANCE ISSUES     Cancer Screening:  Colon: Initial/Next screening at age: CURRENT  Repeat colon cancer screening: every 3 years  Breast: Recommended monthly self " exams; annual professional exam  Mammogram: every 1 year  Cervical: 3 years  Skin: Monthly self skin examination, annual exam by health professional      Lifestyle Counseling:  Lifestyle Modifications: Attempt to lose weight and Continue good lifestyle choices/modifications  Safety Issues: Always wear seatbelt, Avoid texting while driving   Use sunscreen, regular skin examination  Recommended annual dental/vision examination.  Emotional/Stress/Sleep: Reviewed and  given when appropriate    Part of this note may be electronic transcription/translation of spoken language to printed text using the Dragon dictation system

## 2024-01-15 NOTE — PROGRESS NOTES
Called Adore Neves at [phone] (H) to relay results, however was unable to reach patient. LVM asking to please return call.

## 2024-02-11 DIAGNOSIS — G89.4 CHRONIC PAIN SYNDROME: ICD-10-CM

## 2024-02-11 RX ORDER — GABAPENTIN 600 MG/1
600 TABLET ORAL 4 TIMES DAILY
Qty: 28 TABLET | Refills: 0 | Status: CANCELLED | OUTPATIENT
Start: 2024-02-11 | End: 2024-02-18

## 2024-02-12 DIAGNOSIS — G89.4 CHRONIC PAIN SYNDROME: ICD-10-CM

## 2024-02-12 RX ORDER — GABAPENTIN 600 MG/1
600 TABLET ORAL 4 TIMES DAILY
Qty: 360 TABLET | Refills: 1 | Status: SHIPPED | OUTPATIENT
Start: 2024-02-12

## 2024-03-01 DIAGNOSIS — Z76.0 MEDICATION REFILL: ICD-10-CM

## 2024-03-01 RX ORDER — TRAMADOL HYDROCHLORIDE 50 MG/1
50 TABLET ORAL EVERY 8 HOURS PRN
Qty: 120 TABLET | Refills: 1 | Status: SHIPPED | OUTPATIENT
Start: 2024-03-01

## 2024-03-20 DIAGNOSIS — G89.4 CHRONIC PAIN SYNDROME: ICD-10-CM

## 2024-03-20 RX ORDER — GABAPENTIN 600 MG/1
600 TABLET ORAL 4 TIMES DAILY
Qty: 360 TABLET | Refills: 1 | Status: SHIPPED | OUTPATIENT
Start: 2024-03-20

## 2024-03-20 RX ORDER — FEXOFENADINE HCL 180 MG/1
180 TABLET ORAL DAILY
Qty: 90 TABLET | Refills: 1 | Status: SHIPPED | OUTPATIENT
Start: 2024-03-20

## 2024-05-02 ENCOUNTER — OFFICE VISIT (OUTPATIENT)
Dept: FAMILY MEDICINE CLINIC | Facility: CLINIC | Age: 64
End: 2024-05-02
Payer: COMMERCIAL

## 2024-05-02 VITALS
SYSTOLIC BLOOD PRESSURE: 138 MMHG | HEIGHT: 64 IN | WEIGHT: 203 LBS | BODY MASS INDEX: 34.66 KG/M2 | HEART RATE: 81 BPM | TEMPERATURE: 97.7 F | OXYGEN SATURATION: 93 % | DIASTOLIC BLOOD PRESSURE: 60 MMHG

## 2024-05-02 DIAGNOSIS — E66.09 CLASS 1 OBESITY DUE TO EXCESS CALORIES WITH SERIOUS COMORBIDITY AND BODY MASS INDEX (BMI) OF 34.0 TO 34.9 IN ADULT: ICD-10-CM

## 2024-05-02 DIAGNOSIS — E03.9 ACQUIRED HYPOTHYROIDISM: ICD-10-CM

## 2024-05-02 DIAGNOSIS — Z79.899 MEDICATION MANAGEMENT: ICD-10-CM

## 2024-05-02 DIAGNOSIS — E78.2 MIXED HYPERLIPIDEMIA: ICD-10-CM

## 2024-05-02 DIAGNOSIS — Z76.0 MEDICATION REFILL: ICD-10-CM

## 2024-05-02 DIAGNOSIS — I10 ESSENTIAL HYPERTENSION: ICD-10-CM

## 2024-05-02 DIAGNOSIS — G89.4 CHRONIC PAIN SYNDROME: ICD-10-CM

## 2024-05-02 DIAGNOSIS — E55.9 VITAMIN D DEFICIENCY: ICD-10-CM

## 2024-05-02 DIAGNOSIS — E11.65 TYPE 2 DIABETES MELLITUS WITH HYPERGLYCEMIA, WITHOUT LONG-TERM CURRENT USE OF INSULIN: ICD-10-CM

## 2024-05-02 DIAGNOSIS — Z09 FOLLOW-UP EXAM, 3-6 MONTHS SINCE PREVIOUS EXAM: Primary | ICD-10-CM

## 2024-05-02 PROBLEM — E66.812 CLASS 2 SEVERE OBESITY DUE TO EXCESS CALORIES WITH SERIOUS COMORBIDITY AND BODY MASS INDEX (BMI) OF 35.0 TO 35.9 IN ADULT: Status: ACTIVE | Noted: 2024-05-02

## 2024-05-02 PROBLEM — E66.01 CLASS 2 SEVERE OBESITY DUE TO EXCESS CALORIES WITH SERIOUS COMORBIDITY AND BODY MASS INDEX (BMI) OF 35.0 TO 35.9 IN ADULT: Status: ACTIVE | Noted: 2024-05-02

## 2024-05-02 LAB
25(OH)D3 SERPL-MCNC: 62.3 NG/ML (ref 30–100)
ALBUMIN SERPL-MCNC: 3.9 G/DL (ref 3.5–5.2)
ALBUMIN UR-MCNC: 1.4 MG/DL
ALBUMIN/GLOB SERPL: 1.4 G/DL
ALP SERPL-CCNC: 45 U/L (ref 39–117)
ALT SERPL W P-5'-P-CCNC: 12 U/L (ref 1–33)
AMPHET+METHAMPHET UR QL: NEGATIVE
AMPHETAMINE INTERNAL CONTROL: NORMAL
AMPHETAMINES UR QL: NEGATIVE
ANION GAP SERPL CALCULATED.3IONS-SCNC: 11.8 MMOL/L (ref 5–15)
AST SERPL-CCNC: 15 U/L (ref 1–32)
BARBITURATE INTERNAL CONTROL: NORMAL
BARBITURATES UR QL SCN: NEGATIVE
BASOPHILS # BLD AUTO: 0.08 10*3/MM3 (ref 0–0.2)
BASOPHILS NFR BLD AUTO: 0.9 % (ref 0–1.5)
BENZODIAZ UR QL SCN: NEGATIVE
BENZODIAZEPINE INTERNAL CONTROL: NORMAL
BILIRUB SERPL-MCNC: 0.2 MG/DL (ref 0–1.2)
BUN SERPL-MCNC: 16 MG/DL (ref 8–23)
BUN/CREAT SERPL: 27.6 (ref 7–25)
BUPRENORPHINE INTERNAL CONTROL: NORMAL
BUPRENORPHINE SERPL-MCNC: NEGATIVE NG/ML
CALCIUM SPEC-SCNC: 9.7 MG/DL (ref 8.6–10.5)
CANNABINOIDS SERPL QL: NEGATIVE
CHLORIDE SERPL-SCNC: 103 MMOL/L (ref 98–107)
CHOLEST SERPL-MCNC: 150 MG/DL (ref 0–200)
CO2 SERPL-SCNC: 27.2 MMOL/L (ref 22–29)
COCAINE INTERNAL CONTROL: NORMAL
COCAINE UR QL: NEGATIVE
CREAT SERPL-MCNC: 0.58 MG/DL (ref 0.57–1)
DEPRECATED RDW RBC AUTO: 40.9 FL (ref 37–54)
EGFRCR SERPLBLD CKD-EPI 2021: 102.5 ML/MIN/1.73
EOSINOPHIL # BLD AUTO: 0.24 10*3/MM3 (ref 0–0.4)
EOSINOPHIL NFR BLD AUTO: 2.7 % (ref 0.3–6.2)
ERYTHROCYTE [DISTWIDTH] IN BLOOD BY AUTOMATED COUNT: 12.2 % (ref 12.3–15.4)
EXPIRATION DATE: NORMAL
GLOBULIN UR ELPH-MCNC: 2.7 GM/DL
GLUCOSE SERPL-MCNC: 89 MG/DL (ref 65–99)
HBA1C MFR BLD: 8 % (ref 4.8–5.6)
HCT VFR BLD AUTO: 44.7 % (ref 34–46.6)
HDLC SERPL-MCNC: 46 MG/DL (ref 40–60)
HGB BLD-MCNC: 14.9 G/DL (ref 12–15.9)
IMM GRANULOCYTES # BLD AUTO: 0.03 10*3/MM3 (ref 0–0.05)
IMM GRANULOCYTES NFR BLD AUTO: 0.3 % (ref 0–0.5)
LDLC SERPL CALC-MCNC: 84 MG/DL (ref 0–100)
LDLC/HDLC SERPL: 1.8 {RATIO}
LYMPHOCYTES # BLD AUTO: 3.3 10*3/MM3 (ref 0.7–3.1)
LYMPHOCYTES NFR BLD AUTO: 37.1 % (ref 19.6–45.3)
Lab: NORMAL
MCH RBC QN AUTO: 30.7 PG (ref 26.6–33)
MCHC RBC AUTO-ENTMCNC: 33.3 G/DL (ref 31.5–35.7)
MCV RBC AUTO: 92.2 FL (ref 79–97)
MDMA (ECSTASY) INTERNAL CONTROL: NORMAL
MDMA UR QL SCN: NEGATIVE
METHADONE INTERNAL CONTROL: NORMAL
METHADONE UR QL SCN: NEGATIVE
METHAMPHETAMINE INTERNAL CONTROL: NORMAL
MONOCYTES # BLD AUTO: 0.65 10*3/MM3 (ref 0.1–0.9)
MONOCYTES NFR BLD AUTO: 7.3 % (ref 5–12)
MORPHINE INTERNAL CONTROL: NORMAL
MORPHINE/OPIATES SCREEN, URINE: NEGATIVE
NEUTROPHILS NFR BLD AUTO: 4.6 10*3/MM3 (ref 1.7–7)
NEUTROPHILS NFR BLD AUTO: 51.7 % (ref 42.7–76)
NRBC BLD AUTO-RTO: 0 /100 WBC (ref 0–0.2)
OXYCODONE INTERNAL CONTROL: NORMAL
OXYCODONE UR QL SCN: NEGATIVE
PCP UR QL SCN: NEGATIVE
PHENCYCLIDINE INTERNAL CONTROL: NORMAL
PLATELET # BLD AUTO: 230 10*3/MM3 (ref 140–450)
PMV BLD AUTO: 11.9 FL (ref 6–12)
POTASSIUM SERPL-SCNC: 4.4 MMOL/L (ref 3.5–5.2)
PROT SERPL-MCNC: 6.6 G/DL (ref 6–8.5)
RBC # BLD AUTO: 4.85 10*6/MM3 (ref 3.77–5.28)
SODIUM SERPL-SCNC: 142 MMOL/L (ref 136–145)
T-UPTAKE NFR SERPL: 1.1 TBI (ref 0.8–1.3)
T4 SERPL-MCNC: 9.02 MCG/DL (ref 4.5–11.7)
THC INTERNAL CONTROL: NORMAL
TRIGL SERPL-MCNC: 107 MG/DL (ref 0–150)
TSH SERPL DL<=0.05 MIU/L-ACNC: 1.57 UIU/ML (ref 0.27–4.2)
VLDLC SERPL-MCNC: 20 MG/DL (ref 5–40)
WBC NRBC COR # BLD AUTO: 8.9 10*3/MM3 (ref 3.4–10.8)

## 2024-05-02 PROCEDURE — 80305 DRUG TEST PRSMV DIR OPT OBS: CPT | Performed by: NURSE PRACTITIONER

## 2024-05-02 PROCEDURE — 82043 UR ALBUMIN QUANTITATIVE: CPT | Performed by: NURSE PRACTITIONER

## 2024-05-02 PROCEDURE — 84436 ASSAY OF TOTAL THYROXINE: CPT | Performed by: NURSE PRACTITIONER

## 2024-05-02 PROCEDURE — 83036 HEMOGLOBIN GLYCOSYLATED A1C: CPT | Performed by: NURSE PRACTITIONER

## 2024-05-02 PROCEDURE — 82306 VITAMIN D 25 HYDROXY: CPT | Performed by: NURSE PRACTITIONER

## 2024-05-02 PROCEDURE — 80061 LIPID PANEL: CPT | Performed by: NURSE PRACTITIONER

## 2024-05-02 PROCEDURE — 84479 ASSAY OF THYROID (T3 OR T4): CPT | Performed by: NURSE PRACTITIONER

## 2024-05-02 PROCEDURE — 80050 GENERAL HEALTH PANEL: CPT | Performed by: NURSE PRACTITIONER

## 2024-05-02 PROCEDURE — 99214 OFFICE O/P EST MOD 30 MIN: CPT | Performed by: NURSE PRACTITIONER

## 2024-05-02 RX ORDER — GABAPENTIN 600 MG/1
600 TABLET ORAL 4 TIMES DAILY
Qty: 360 TABLET | Refills: 1 | Status: SHIPPED | OUTPATIENT
Start: 2024-05-02

## 2024-05-02 RX ORDER — TRAMADOL HYDROCHLORIDE 50 MG/1
50 TABLET ORAL EVERY 8 HOURS PRN
Qty: 120 TABLET | Refills: 1 | Status: SHIPPED | OUTPATIENT
Start: 2024-05-02

## 2024-05-02 NOTE — PROGRESS NOTES
Chief Complaint  Med Refill (gabapentin (NEURONTIN) 600 MG tablet/traMADol (ULTRAM) 50 MG tablet)    Subjective        Medical History: has a past medical history of Chronic pain, COPD (chronic obstructive pulmonary disease), Diabetes mellitus, Hyperlipidemia, Hypertension, and Vitamin D deficiency.     Surgical History: has a past surgical history that includes Abdominal hysterectomy; Replacement total knee bilateral; Laparoscopic cholecystectomy; and Tonsillectomy.     Family History: family history includes Diabetes in her brother; Heart disease in her father, mother, and another family member; Nephrolithiasis in her brother; Other in her brother.     Social History: reports that she quit smoking about 2 years ago. Her smoking use included cigarettes. She started smoking about 44 years ago. She has a 20.8 pack-year smoking history. She has never used smokeless tobacco. Alcohol use questions deferred to the physician. She reports that she does not use drugs.    Adore Neves presents to [facility]  History of Present Illness  Diabetes  She presents for her follow-up diabetic visit. She has type 2 diabetes mellitus. Her disease course has been stable. There are no hypoglycemic associated symptoms. There are no diabetic associated symptoms. There are no hypoglycemic complications. Risk factors for coronary artery disease include diabetes mellitus, dyslipidemia, obesity, post-menopausal and hypertension. Current diabetic treatment includes oral agent (triple therapy). She is compliant with treatment all of the time. She is following a generally healthy diet. When asked about meal planning, she reported none. An ACE inhibitor/angiotensin II receptor blocker is being taken.   Hypertension  This is a chronic problem. The current episode started more than 1 year ago. Pertinent negatives include no anxiety, peripheral edema or shortness of breath. Risk factors for coronary artery  "disease include dyslipidemia, diabetes mellitus, post-menopausal state and obesity. Past treatments include beta blockers. Current antihypertension treatment includes beta blockers. The current treatment provides significant improvement. There are no compliance problems.    Hyperlipidemia  This is a chronic problem. The current episode started more than 1 year ago. Exacerbating diseases include diabetes and obesity. Pertinent negatives include no shortness of breath. Current antihyperlipidemic treatment includes statins. The current treatment provides moderate improvement of lipids. Risk factors for coronary artery disease include diabetes mellitus, family history, dyslipidemia, hypertension, obesity and post-menopausal.      Objective   Vital Signs:   /60 (BP Location: Right arm, Patient Position: Sitting, Cuff Size: Adult)   Pulse 81   Temp 97.7 °F (36.5 °C) (Infrared)   Ht 162.6 cm (64\")   Wt 92.1 kg (203 lb)   SpO2 93% Comment: Room Air  BMI 34.84 kg/m²       Wt Readings from Last 3 Encounters:   05/02/24 92.1 kg (203 lb)   01/10/24 93.9 kg (207 lb)   08/04/23 93.9 kg (207 lb)        BP Readings from Last 3 Encounters:   05/02/24 138/60   01/10/24 126/68   08/04/23 142/62        Class 2 Severe Obesity (BMI >=35 and <=39.9). Obesity-related health conditions include the following: hypertension, diabetes mellitus, dyslipidemias, GERD, and osteoarthritis. Obesity is improving with treatment. BMI is is above average; BMI management plan is completed. We discussed low calorie, low carb based diet program, portion control, and increasing exercise.       Physical Exam  Vitals reviewed.   Constitutional:       General: She is not in acute distress.     Appearance: Normal appearance. She is well-developed. She is obese. She is not ill-appearing.   HENT:      Head: Normocephalic and atraumatic.      Right Ear: External ear normal.      Left Ear: External ear normal.   Eyes:      Conjunctiva/sclera: " Conjunctivae normal.      Pupils: Pupils are equal, round, and reactive to light.   Cardiovascular:      Rate and Rhythm: Normal rate and regular rhythm.      Heart sounds: No murmur heard.  Pulmonary:      Effort: Pulmonary effort is normal.      Breath sounds: Normal breath sounds. No wheezing.   Musculoskeletal:      Right lower leg: No edema.      Left lower leg: No edema.   Skin:     General: Skin is warm and dry.   Neurological:      Mental Status: She is alert and oriented to person, place, and time.   Psychiatric:         Mood and Affect: Mood and affect normal.         Behavior: Behavior normal.         Thought Content: Thought content normal.         Judgment: Judgment normal.        Result Review :  {The following data was reviewed by HUSAM Brooks on 05/02/2024.  Common labs          8/4/2023    08:15 1/10/2024    08:33   Common Labs   Glucose 117  136    BUN 17  18    Creatinine 1.06  0.80    Sodium 143  139    Potassium 4.4  4.8    Chloride 105  99    Calcium 10.0  10.3    Albumin 4.2  4.5    Total Bilirubin 0.3  0.2    Alkaline Phosphatase 53  68    AST (SGOT) 23  18    ALT (SGPT) 22  18    WBC 10.06  10.96    Hemoglobin 14.3  17.1    Hematocrit 42.5  50.4    Platelets 258  226    Total Cholesterol 139  274    Triglycerides 150  398    HDL Cholesterol 46  46    LDL Cholesterol  67  153    Hemoglobin A1C 8.10  9.40    Microalbumin, Urine 2.6  6.6      Data reviewed : Previous office note             Current Outpatient Medications on File Prior to Visit   Medication Sig Dispense Refill    albuterol sulfate  (90 Base) MCG/ACT inhaler Inhale 2 puffs Every 4 (Four) Hours As Needed for Wheezing. 8.5 g 2    atorvastatin (LIPITOR) 80 MG tablet Take 1 tablet by mouth Daily. 90 tablet 1    ergocalciferol (ERGOCALCIFEROL) 1.25 MG (74425 UT) capsule Take 1 capsule by mouth 1 (One) Time Per Week. 13 capsule 0    fenofibrate 160 MG tablet Take 1 tablet by mouth Daily. 90 tablet 1    fexofenadine  (Allergy Relief) 180 MG tablet Take 1 tablet by mouth Daily. 90 tablet 1    glucose blood test strip 1 each by Other route As Needed (USE 2 TIMES NEWBERRY UP TO 3 TIMES A DAY IF NEEDED). Use as instructed UP TO 3 TIMES A  each 2    glyburide (DIAbeta) 5 MG tablet Take 1 tablet by mouth 2 (Two) Times a Day With Meals. 180 tablet 1    Lancets misc Use 1 each 2 (Two) Times a Day As Needed (US 2 TIMES NEWBERRY UP TO 3 TIMES A DAY IF NEEDED). 200 each 2    metFORMIN (GLUCOPHAGE) 1000 MG tablet TAKE 1 TABLET TWICE DAILY  WITH MEALS 180 tablet 1    multivitamin with minerals (MULTIVITAMIN ADULT PO) Take 1 tablet by mouth Daily. 90 tablet 1    [DISCONTINUED] gabapentin (NEURONTIN) 600 MG tablet Take 1 tablet by mouth 4 (Four) Times a Day. 360 tablet 1    [DISCONTINUED] traMADol (ULTRAM) 50 MG tablet Take 1 tablet by mouth Every 8 (Eight) Hours As Needed for Moderate Pain or Severe Pain. 120 tablet 1    metoprolol tartrate (LOPRESSOR) 25 MG tablet Take 1 tablet by mouth 2 (Two) Times a Day for 90 days. 180 tablet 1     No current facility-administered medications on file prior to visit.      Pain Management Panel  More data exists         Latest Ref Rng & Units 5/2/2024 1/18/2023   Pain Management Panel   Amphetamine, Urine Qual Negative Negative  Negative    Barbiturates Screen, Urine Negative Negative  Negative    Benzodiazepine Screen, Urine Negative Negative  Negative    Buprenorphine, Screen, Urine Negative Negative  Negative    Cocaine Screen, Urine Negative Negative  Negative    Methadone Screen , Urine Negative Negative  Negative    Methamphetamine, Ur Negative Negative  Negative       Risks and benefits of opioid medication such as tolerance, dependence, addiction, misuse, abuse, diversion, opioid use disorder, accidental overdose resulting in death, drug interactions, and other systemic side effects were discussed with the patient.    Assessment and Plan  Diagnoses and all orders for this visit:    1. Follow-up  exam, 3-6 months since previous exam (Primary)    2. Type 2 diabetes mellitus with hyperglycemia, without long-term current use of insulin  -     CBC Auto Differential  -     Comprehensive Metabolic Panel  -     Hemoglobin A1c  -     Lipid Panel  -     MicroAlbumin, Urine, Random - Urine, Clean Catch    3. Class 1 obesity due to excess calories with serious comorbidity and body mass index (BMI) of 34.0 to 34.9 in adult    4. Mixed hyperlipidemia  -     CBC Auto Differential  -     Comprehensive Metabolic Panel  -     Lipid Panel    5. Essential hypertension  -     CBC Auto Differential  -     Comprehensive Metabolic Panel    6. Vitamin D deficiency  -     Vitamin D,25-Hydroxy    7. Acquired hypothyroidism  -     Thyroid Panel With TSH    8. Chronic pain syndrome  Comments:  Patient needs refill of her gabapentin.  Orders:  -     gabapentin (NEURONTIN) 600 MG tablet; Take 1 tablet by mouth 4 (Four) Times a Day.  Dispense: 360 tablet; Refill: 1    9. Medication refill  Comments:  Tramadol refilled at today's visit.  Ole and MAGNO appropriate.  Orders:  -     traMADol (ULTRAM) 50 MG tablet; Take 1 tablet by mouth Every 8 (Eight) Hours As Needed for Moderate Pain or Severe Pain.  Dispense: 120 tablet; Refill: 1    10. Medication management  -     POC Medline 12 Panel Urine Drug Screen    Patient comes in for follow-up, overall doing well, she states she feels good, she does admit that she is not eating as well as she should and that her blood sugars have been running higher than previously.  She is currently on the glyburide and the metformin.  Discussed with patient we would start on a low-dose of Ozempic patient given sample in office along with a coupon card, discussed with patient if it still expensive even with a coupon card to call and we will try something different.  Patient verbalized understanding agreeable with treatment plan.    Follow Up   No follow-ups on file.  Patient was given instructions and  counseling regarding her condition or for health maintenance advice. Please see specific information pulled into the AVS if appropriate.       Part of this note may be electronic transcription/translation of spoken language to printed text using the Dragon dictation system

## 2024-05-13 ENCOUNTER — TELEPHONE (OUTPATIENT)
Dept: FAMILY MEDICINE CLINIC | Facility: CLINIC | Age: 64
End: 2024-05-13

## 2024-05-13 NOTE — TELEPHONE ENCOUNTER
Caller: Adore Neves    Relationship to patient: Self    Best call back number: [phone]    Patient is needing: PATIENT CALLED IN AND SAID [facility], [facility]. - [address] [phone] [facility] [phone]  [phone] IS NEEDING TO HAVE PATIENT'S SOCIAL SECURITY NUMBER ON PRESCRIPTION IN ORDER TO FILL     gabapentin (NEURONTIN) 600 MG tablet

## 2024-05-14 NOTE — TELEPHONE ENCOUNTER
Called and spoke with [facility] who advised  me the patient would need to call them her self to give them her SSC and have them add it to her profile, I called and advised the patient and she voiced understanding

## 2024-08-01 DIAGNOSIS — G89.4 CHRONIC PAIN SYNDROME: ICD-10-CM

## 2024-08-01 RX ORDER — GABAPENTIN 600 MG/1
600 TABLET ORAL 4 TIMES DAILY
Qty: 360 TABLET | Refills: 1 | Status: SHIPPED | OUTPATIENT
Start: 2024-08-01

## 2024-08-02 DIAGNOSIS — Z76.0 MEDICATION REFILL: ICD-10-CM

## 2024-08-02 RX ORDER — TRAMADOL HYDROCHLORIDE 50 MG/1
50 TABLET ORAL EVERY 8 HOURS PRN
Qty: 120 TABLET | Refills: 1 | Status: SHIPPED | OUTPATIENT
Start: 2024-08-02

## 2024-09-04 ENCOUNTER — OFFICE VISIT (OUTPATIENT)
Dept: FAMILY MEDICINE CLINIC | Facility: CLINIC | Age: 64
End: 2024-09-04
Payer: COMMERCIAL

## 2024-09-04 VITALS
HEIGHT: 64 IN | TEMPERATURE: 97.9 F | HEART RATE: 97 BPM | WEIGHT: 205.9 LBS | BODY MASS INDEX: 35.15 KG/M2 | OXYGEN SATURATION: 94 % | DIASTOLIC BLOOD PRESSURE: 66 MMHG | SYSTOLIC BLOOD PRESSURE: 138 MMHG

## 2024-09-04 DIAGNOSIS — E03.9 ACQUIRED HYPOTHYROIDISM: ICD-10-CM

## 2024-09-04 DIAGNOSIS — E66.01 CLASS 2 SEVERE OBESITY DUE TO EXCESS CALORIES WITH SERIOUS COMORBIDITY AND BODY MASS INDEX (BMI) OF 35.0 TO 35.9 IN ADULT: ICD-10-CM

## 2024-09-04 DIAGNOSIS — E78.2 MIXED HYPERLIPIDEMIA: ICD-10-CM

## 2024-09-04 DIAGNOSIS — Z12.11 SCREEN FOR COLON CANCER: ICD-10-CM

## 2024-09-04 DIAGNOSIS — Z12.11 COLON CANCER SCREENING: ICD-10-CM

## 2024-09-04 DIAGNOSIS — I10 ESSENTIAL HYPERTENSION: ICD-10-CM

## 2024-09-04 DIAGNOSIS — E55.9 VITAMIN D DEFICIENCY: ICD-10-CM

## 2024-09-04 DIAGNOSIS — G89.4 CHRONIC PAIN SYNDROME: ICD-10-CM

## 2024-09-04 DIAGNOSIS — E11.65 TYPE 2 DIABETES MELLITUS WITH HYPERGLYCEMIA, WITHOUT LONG-TERM CURRENT USE OF INSULIN: ICD-10-CM

## 2024-09-04 DIAGNOSIS — Z09 FOLLOW-UP EXAM: Primary | ICD-10-CM

## 2024-09-04 PROCEDURE — 99214 OFFICE O/P EST MOD 30 MIN: CPT | Performed by: NURSE PRACTITIONER

## 2024-09-04 RX ORDER — GABAPENTIN 600 MG/1
600 TABLET ORAL 4 TIMES DAILY
Qty: 360 TABLET | Refills: 1 | Status: SHIPPED | OUTPATIENT
Start: 2024-09-04

## 2024-09-04 NOTE — PROGRESS NOTES
Chief Complaint  Diabetes (Follow-up) and Med Refill (gabapentin)    Subjective        Medical History: has a past medical history of Chronic pain, COPD (chronic obstructive pulmonary disease), Diabetes mellitus, Hyperlipidemia, Hypertension, and Vitamin D deficiency.     Surgical History: has a past surgical history that includes Abdominal hysterectomy; Replacement total knee bilateral; Laparoscopic cholecystectomy; and Tonsillectomy.     Family History: family history includes Diabetes in her brother; Heart disease in her father, mother, and another family member; Nephrolithiasis in her brother; Other in her brother.     Social History: reports that she quit smoking about 3 years ago. Her smoking use included cigarettes. She started smoking about 44 years ago. She has a 20.8 pack-year smoking history. She has been exposed to tobacco smoke. She has never used smokeless tobacco. Alcohol use questions deferred to the physician. She reports that she does not use drugs.    Adore Neves presents to [facility]  History of Present Illness  Diabetes  She presents for her follow-up diabetic visit. She has type 2 diabetes mellitus. Her disease course has been stable. There are no hypoglycemic associated symptoms. There are no diabetic associated symptoms. There are no hypoglycemic complications. Risk factors for coronary artery disease include diabetes mellitus, dyslipidemia, obesity, post-menopausal and hypertension. Current diabetic treatment includes oral agent (triple therapy). She is compliant with treatment all of the time. She is following a generally healthy diet. When asked about meal planning, she reported none. An ACE inhibitor/angiotensin II receptor blocker is being taken.   Hypertension  This is a chronic problem. The current episode started more than 1 year ago. Pertinent negatives include no anxiety, peripheral edema or shortness of breath. Risk factors for coronary  "artery disease include dyslipidemia, diabetes mellitus, post-menopausal state and obesity. Past treatments include beta blockers. Current antihypertension treatment includes beta blockers. The current treatment provides significant improvement. There are no compliance problems.    Hyperlipidemia  This is a chronic problem. The current episode started more than 1 year ago. Exacerbating diseases include diabetes and obesity. Pertinent negatives include no shortness of breath. Current antihyperlipidemic treatment includes statins. The current treatment provides moderate improvement of lipids. Risk factors for coronary artery disease include diabetes mellitus, family history, dyslipidemia, hypertension, obesity and post-menopausal.     Objective   Vital Signs:   /66   Pulse 97   Temp 97.9 °F (36.6 °C)   Ht 162.6 cm (64\")   Wt 93.4 kg (205 lb 14.4 oz)   SpO2 94%   BMI 35.34 kg/m²       Wt Readings from Last 3 Encounters:   09/04/24 93.4 kg (205 lb 14.4 oz)   05/02/24 92.1 kg (203 lb)   01/10/24 93.9 kg (207 lb)        BP Readings from Last 3 Encounters:   09/04/24 138/66   05/02/24 138/60   01/10/24 126/68     Physical Exam  Vitals reviewed.   Constitutional:       General: She is not in acute distress.     Appearance: Normal appearance. She is well-developed. She is obese. She is not ill-appearing.   HENT:      Head: Normocephalic and atraumatic.   Eyes:      Conjunctiva/sclera: Conjunctivae normal.      Pupils: Pupils are equal, round, and reactive to light.   Cardiovascular:      Rate and Rhythm: Normal rate and regular rhythm.      Heart sounds: No murmur heard.  Pulmonary:      Effort: Pulmonary effort is normal.      Breath sounds: Normal breath sounds. No wheezing.   Musculoskeletal:      Right lower leg: No edema.      Left lower leg: No edema.   Skin:     General: Skin is warm and dry.   Neurological:      Mental Status: She is alert and oriented to person, place, and time.   Psychiatric:         " Mood and Affect: Mood and affect normal.         Behavior: Behavior normal.         Thought Content: Thought content normal.         Judgment: Judgment normal.        Result Review :  {The following data was reviewed by HUSAM Brooks on 09/04/2024.  Common labs          1/10/2024    08:33 5/2/2024    09:14   Common Labs   Glucose 136  89    BUN 18  16    Creatinine 0.80  0.58    Sodium 139  142    Potassium 4.8  4.4    Chloride 99  103    Calcium 10.3  9.7    Albumin 4.5  3.9    Total Bilirubin 0.2  0.2    Alkaline Phosphatase 68  45    AST (SGOT) 18  15    ALT (SGPT) 18  12    WBC 10.96  8.90    Hemoglobin 17.1  14.9    Hematocrit 50.4  44.7    Platelets 226  230    Total Cholesterol 274  150    Triglycerides 398  107    HDL Cholesterol 46  46    LDL Cholesterol  153  84    Hemoglobin A1C 9.40  8.00    Microalbumin, Urine 6.6  1.4      Data reviewed : Previous office note             Current Outpatient Medications on File Prior to Visit   Medication Sig Dispense Refill    albuterol sulfate  (90 Base) MCG/ACT inhaler Inhale 2 puffs Every 4 (Four) Hours As Needed for Wheezing. 8.5 g 2    atorvastatin (LIPITOR) 80 MG tablet Take 1 tablet by mouth Daily. 90 tablet 1    ergocalciferol (ERGOCALCIFEROL) 1.25 MG (66206 UT) capsule Take 1 capsule by mouth 1 (One) Time Per Week. 13 capsule 0    fenofibrate 160 MG tablet Take 1 tablet by mouth Daily. 90 tablet 1    fexofenadine (Allergy Relief) 180 MG tablet Take 1 tablet by mouth Daily. 90 tablet 1    glucose blood test strip 1 each by Other route As Needed (USE 2 TIMES NEWBERRY UP TO 3 TIMES A DAY IF NEEDED). Use as instructed UP TO 3 TIMES A  each 2    glyburide (DIAbeta) 5 MG tablet Take 1 tablet by mouth 2 (Two) Times a Day With Meals. 180 tablet 1    Lancets misc Use 1 each 2 (Two) Times a Day As Needed (US 2 TIMES NEWBERRY UP TO 3 TIMES A DAY IF NEEDED). 200 each 2    metFORMIN (GLUCOPHAGE) 1000 MG tablet Take 1 tablet by mouth 2 (Two) Times a  Day With Meals. 180 tablet 1    metoprolol tartrate (LOPRESSOR) 25 MG tablet Take 1 tablet by mouth 2 (Two) Times a Day for 90 days. 180 tablet 1    multivitamin with minerals (MULTIVITAMIN ADULT PO) Take 1 tablet by mouth Daily. 90 tablet 1    traMADol (ULTRAM) 50 MG tablet Take 1 tablet by mouth Every 8 (Eight) Hours As Needed for Moderate Pain or Severe Pain. 120 tablet 1    [DISCONTINUED] gabapentin (NEURONTIN) 600 MG tablet Take 1 tablet by mouth 4 (Four) Times a Day. 360 tablet 1     No current facility-administered medications on file prior to visit.        Assessment and Plan  Diagnoses and all orders for this visit:    1. Follow-up exam (Primary)    2. Screen for colon cancer  -     Cologuard - Stool, Per Rectum; Future    3. Chronic pain syndrome  Comments:  Patient needs refill of her gabapentin.  Orders:  -     gabapentin (NEURONTIN) 600 MG tablet; Take 1 tablet by mouth 4 (Four) Times a Day.  Dispense: 360 tablet; Refill: 1    4. Type 2 diabetes mellitus with hyperglycemia, without long-term current use of insulin  -     Thyroid Panel With TSH; Future  -     CBC Auto Differential; Future  -     Comprehensive Metabolic Panel; Future  -     Hemoglobin A1c; Future  -     Lipid Panel; Future  -     MicroAlbumin, Urine, Random - Urine, Clean Catch; Future    5. Class 2 severe obesity due to excess calories with serious comorbidity and body mass index (BMI) of 35.0 to 35.9 in adult    6. Mixed hyperlipidemia  -     CBC Auto Differential; Future  -     Comprehensive Metabolic Panel; Future  -     Lipid Panel; Future    7. Essential hypertension  -     CBC Auto Differential; Future  -     Comprehensive Metabolic Panel; Future    8. Vitamin D deficiency    9. Acquired hypothyroidism  -     Thyroid Panel With TSH; Future    10. Colon cancer screening      Pain Management Panel  More data exists         Latest Ref Rng & Units 5/2/2024 1/18/2023   Pain Management Panel   Amphetamine, Urine Qual Negative Negative   Negative    Barbiturates Screen, Urine Negative Negative  Negative    Benzodiazepine Screen, Urine Negative Negative  Negative    Buprenorphine, Screen, Urine Negative Negative  Negative    Cocaine Screen, Urine Negative Negative  Negative    Methadone Screen , Urine Negative Negative  Negative    Methamphetamine, Ur Negative Negative  Negative       Details               Patient comes in for follow-up today, and overall doing well, needing medication refill for gabapentin UDS and consent is up-to-date, patient at low risk for developing dependency.  Blood pressure stable at 138/66.  Will order labs, patient will go down to hospital to get them completed since were unable to do them in office today.  Discussed return precautions, patient verbalized understanding agreeable treatment plan    Follow Up   Return in about 3 months (around 12/4/2024).  Patient was given instructions and counseling regarding her condition or for health maintenance advice. Please see specific information pulled into the AVS if appropriate.       Part of this note may be electronic transcription/translation of spoken language to printed text using the Dragon dictation system

## 2024-10-07 DIAGNOSIS — Z76.0 MEDICATION REFILL: ICD-10-CM

## 2024-10-08 RX ORDER — FEXOFENADINE HCL 180 MG/1
180 TABLET ORAL DAILY
Qty: 90 TABLET | Refills: 1 | Status: SHIPPED | OUTPATIENT
Start: 2024-10-08

## 2024-10-09 ENCOUNTER — PRIOR AUTHORIZATION (OUTPATIENT)
Dept: FAMILY MEDICINE CLINIC | Facility: CLINIC | Age: 64
End: 2024-10-09
Payer: COMMERCIAL

## 2024-10-09 RX ORDER — TRAMADOL HYDROCHLORIDE 50 MG/1
50 TABLET ORAL EVERY 8 HOURS PRN
Qty: 120 TABLET | Refills: 1 | Status: SHIPPED | OUTPATIENT
Start: 2024-10-09

## 2024-10-09 NOTE — TELEPHONE ENCOUNTER
PA Response for traMADol HCl 50MG tablets - Adore Neves (Key: [id number])    Need Help? Call us at [phone]    Outcome: The member benefit does not include pharmacy drug coverage. Eligible drugs may be covered under the medical benefit.    Drug: traMADol HCl 50MG tablets    Form: [employer] Electronic PA Form (2017 [employer])

## 2024-10-24 ENCOUNTER — PRIOR AUTHORIZATION (OUTPATIENT)
Dept: FAMILY MEDICINE CLINIC | Facility: CLINIC | Age: 64
End: 2024-10-24
Payer: COMMERCIAL

## 2024-10-24 NOTE — TELEPHONE ENCOUNTER
Additional Information Required  The member benefit does not include pharmacy drug coverage. Eligible drugs may be covered under the medical benefit.

## 2024-11-19 ENCOUNTER — APPOINTMENT (OUTPATIENT)
Dept: GENERAL RADIOLOGY | Facility: HOSPITAL | Age: 64
End: 2024-11-19
Payer: COMMERCIAL

## 2024-11-19 ENCOUNTER — HOSPITAL ENCOUNTER (EMERGENCY)
Facility: HOSPITAL | Age: 64
Discharge: ANOTHER HOSPITAL | End: 2024-11-19
Attending: EMERGENCY MEDICINE | Admitting: EMERGENCY MEDICINE
Payer: COMMERCIAL

## 2024-11-19 VITALS
HEIGHT: 64 IN | SYSTOLIC BLOOD PRESSURE: 159 MMHG | OXYGEN SATURATION: 91 % | RESPIRATION RATE: 18 BRPM | DIASTOLIC BLOOD PRESSURE: 80 MMHG | WEIGHT: 211.64 LBS | BODY MASS INDEX: 36.13 KG/M2 | HEART RATE: 87 BPM | TEMPERATURE: 97.9 F

## 2024-11-19 DIAGNOSIS — S72.402A CLOSED FRACTURE OF DISTAL END OF LEFT FEMUR, UNSPECIFIED FRACTURE MORPHOLOGY, INITIAL ENCOUNTER: Primary | ICD-10-CM

## 2024-11-19 LAB
ALBUMIN SERPL-MCNC: 3.8 G/DL (ref 3.5–5.2)
ALBUMIN/GLOB SERPL: 1.3 G/DL
ALP SERPL-CCNC: 73 U/L (ref 39–117)
ALT SERPL W P-5'-P-CCNC: 13 U/L (ref 1–33)
ANION GAP SERPL CALCULATED.3IONS-SCNC: 9 MMOL/L (ref 5–15)
AST SERPL-CCNC: 10 U/L (ref 1–32)
BASOPHILS # BLD AUTO: 0.1 10*3/MM3 (ref 0–0.2)
BASOPHILS NFR BLD AUTO: 0.7 % (ref 0–1.5)
BILIRUB SERPL-MCNC: 0.2 MG/DL (ref 0–1.2)
BUN SERPL-MCNC: 16 MG/DL (ref 8–23)
BUN/CREAT SERPL: 28.1 (ref 7–25)
CALCIUM SPEC-SCNC: 9.6 MG/DL (ref 8.6–10.5)
CHLORIDE SERPL-SCNC: 100 MMOL/L (ref 98–107)
CO2 SERPL-SCNC: 26 MMOL/L (ref 22–29)
CREAT SERPL-MCNC: 0.57 MG/DL (ref 0.57–1)
DEPRECATED RDW RBC AUTO: 40.3 FL (ref 37–54)
EGFRCR SERPLBLD CKD-EPI 2021: 102.3 ML/MIN/1.73
EOSINOPHIL # BLD AUTO: 0.08 10*3/MM3 (ref 0–0.4)
EOSINOPHIL NFR BLD AUTO: 0.5 % (ref 0.3–6.2)
ERYTHROCYTE [DISTWIDTH] IN BLOOD BY AUTOMATED COUNT: 12.1 % (ref 12.3–15.4)
GLOBULIN UR ELPH-MCNC: 2.9 GM/DL
GLUCOSE SERPL-MCNC: 280 MG/DL (ref 65–99)
HCT VFR BLD AUTO: 43.6 % (ref 34–46.6)
HGB BLD-MCNC: 15.1 G/DL (ref 12–15.9)
IMM GRANULOCYTES # BLD AUTO: 0.06 10*3/MM3 (ref 0–0.05)
IMM GRANULOCYTES NFR BLD AUTO: 0.4 % (ref 0–0.5)
INR PPP: 0.92 (ref 0.86–1.15)
LYMPHOCYTES # BLD AUTO: 1.97 10*3/MM3 (ref 0.7–3.1)
LYMPHOCYTES NFR BLD AUTO: 12.9 % (ref 19.6–45.3)
MCH RBC QN AUTO: 31.5 PG (ref 26.6–33)
MCHC RBC AUTO-ENTMCNC: 34.6 G/DL (ref 31.5–35.7)
MCV RBC AUTO: 91 FL (ref 79–97)
MONOCYTES # BLD AUTO: 0.77 10*3/MM3 (ref 0.1–0.9)
MONOCYTES NFR BLD AUTO: 5.1 % (ref 5–12)
NEUTROPHILS NFR BLD AUTO: 12.24 10*3/MM3 (ref 1.7–7)
NEUTROPHILS NFR BLD AUTO: 80.4 % (ref 42.7–76)
NRBC BLD AUTO-RTO: 0 /100 WBC (ref 0–0.2)
PLATELET # BLD AUTO: 203 10*3/MM3 (ref 140–450)
PMV BLD AUTO: 11.2 FL (ref 6–12)
POTASSIUM SERPL-SCNC: 4.9 MMOL/L (ref 3.5–5.2)
PROT SERPL-MCNC: 6.7 G/DL (ref 6–8.5)
PROTHROMBIN TIME: 12.6 SECONDS (ref 11.8–14.9)
QT INTERVAL: 352 MS
QTC INTERVAL: 429 MS
RBC # BLD AUTO: 4.79 10*6/MM3 (ref 3.77–5.28)
SODIUM SERPL-SCNC: 135 MMOL/L (ref 136–145)
WBC NRBC COR # BLD AUTO: 15.22 10*3/MM3 (ref 3.4–10.8)

## 2024-11-19 PROCEDURE — 25010000002 ONDANSETRON PER 1 MG: Performed by: EMERGENCY MEDICINE

## 2024-11-19 PROCEDURE — 85610 PROTHROMBIN TIME: CPT | Performed by: EMERGENCY MEDICINE

## 2024-11-19 PROCEDURE — 96374 THER/PROPH/DIAG INJ IV PUSH: CPT

## 2024-11-19 PROCEDURE — 80053 COMPREHEN METABOLIC PANEL: CPT | Performed by: EMERGENCY MEDICINE

## 2024-11-19 PROCEDURE — 99285 EMERGENCY DEPT VISIT HI MDM: CPT

## 2024-11-19 PROCEDURE — 73060 X-RAY EXAM OF HUMERUS: CPT

## 2024-11-19 PROCEDURE — 93005 ELECTROCARDIOGRAM TRACING: CPT | Performed by: EMERGENCY MEDICINE

## 2024-11-19 PROCEDURE — 25010000002 HYDROMORPHONE 1 MG/ML SOLUTION: Performed by: EMERGENCY MEDICINE

## 2024-11-19 PROCEDURE — 73560 X-RAY EXAM OF KNEE 1 OR 2: CPT

## 2024-11-19 PROCEDURE — 85025 COMPLETE CBC W/AUTO DIFF WBC: CPT | Performed by: EMERGENCY MEDICINE

## 2024-11-19 PROCEDURE — 25810000003 LACTATED RINGERS SOLUTION: Performed by: EMERGENCY MEDICINE

## 2024-11-19 PROCEDURE — 96375 TX/PRO/DX INJ NEW DRUG ADDON: CPT

## 2024-11-19 PROCEDURE — 71045 X-RAY EXAM CHEST 1 VIEW: CPT

## 2024-11-19 PROCEDURE — 73030 X-RAY EXAM OF SHOULDER: CPT

## 2024-11-19 RX ORDER — ONDANSETRON 2 MG/ML
4 INJECTION INTRAMUSCULAR; INTRAVENOUS ONCE
Status: COMPLETED | OUTPATIENT
Start: 2024-11-19 | End: 2024-11-19

## 2024-11-19 RX ADMIN — HYDROMORPHONE HYDROCHLORIDE 1 MG: 1 INJECTION, SOLUTION INTRAMUSCULAR; INTRAVENOUS; SUBCUTANEOUS at 15:01

## 2024-11-19 RX ADMIN — ONDANSETRON 4 MG: 2 INJECTION, SOLUTION INTRAMUSCULAR; INTRAVENOUS at 15:00

## 2024-11-19 RX ADMIN — SODIUM CHLORIDE, SODIUM LACTATE, POTASSIUM CHLORIDE, AND CALCIUM CHLORIDE 1000 ML: .6; .31; .03; .02 INJECTION, SOLUTION INTRAVENOUS at 15:08

## 2024-11-19 NOTE — ED PROVIDER NOTES
Time: 2:28 PM EST  Date of encounter:  11/19/2024  Independent Historian/Clinical History and Information was obtained by:   Patient and EMS    History is limited by: N/A    Chief Complaint: Fall with left knee injury      History of Present Illness:  Patient is a 64 y.o. year old female who presents to the emergency department for evaluation of fall with left knee injury.  This patient states that she slipped on a porch and injured her left knee.  She did not hit her head or lose consciousness she denies any other pain or injury.  She has been able to ambulate call EMS.  The patient has obvious deformity to the left knee.  On that knee approximately 10 years ago by Dr. Han      Patient Care Team  Primary Care Provider: Kiesha Evans APRN    Past Medical History:     No Known Allergies  Past Medical History:   Diagnosis Date    Chronic pain     COPD (chronic obstructive pulmonary disease)     Diabetes mellitus     Hyperlipidemia     Hypertension     Vitamin D deficiency      Past Surgical History:   Procedure Laterality Date    ABDOMINAL HYSTERECTOMY      LAPAROSCOPIC CHOLECYSTECTOMY      REPLACEMENT TOTAL KNEE BILATERAL      TONSILLECTOMY       Family History   Problem Relation Age of Onset    Heart disease Mother     Heart disease Father     Diabetes Brother     Nephrolithiasis Brother     Other Brother         BLADDER CALCULUS    Heart disease Other        Home Medications:  Prior to Admission medications    Medication Sig Start Date End Date Taking? Authorizing Provider   albuterol sulfate  (90 Base) MCG/ACT inhaler Inhale 2 puffs Every 4 (Four) Hours As Needed for Wheezing. 8/11/21   Kiesha Evans APRN   atorvastatin (LIPITOR) 80 MG tablet Take 1 tablet by mouth Daily. 1/18/23   Kiesha Evans APRN   ergocalciferol (ERGOCALCIFEROL) 1.25 MG (66505 UT) capsule Take 1 capsule by mouth 1 (One) Time Per Week. 11/28/23   Kiesha Evans APRN   fenofibrate 160 MG tablet Take 1  tablet by mouth Daily. 11/28/23   Kiesha Evans APRN   fexofenadine (Allergy Relief) 180 MG tablet Take 1 tablet by mouth Daily. 10/8/24   Kiesha Evans APRN   gabapentin (NEURONTIN) 600 MG tablet Take 1 tablet by mouth 4 (Four) Times a Day. 9/4/24   Kiesha Evans APRN   glucose blood test strip 1 each by Other route As Needed (USE 2 TIMES NEWBERRY UP TO 3 TIMES A DAY IF NEEDED). Use as instructed UP TO 3 TIMES A DAY 12/4/23   Kiesha Evans APRN   glyburide (DIAbeta) 5 MG tablet Take 1 tablet by mouth 2 (Two) Times a Day With Meals. 11/28/23   Kiesha Evans APRN   Lancets misc Use 1 each 2 (Two) Times a Day As Needed (US 2 TIMES NEWBERRY UP TO 3 TIMES A DAY IF NEEDED). 12/4/23   Kiesha Evans APRN   metFORMIN (GLUCOPHAGE) 1000 MG tablet Take 1 tablet by mouth 2 (Two) Times a Day With Meals. 8/20/24   Kiesha Evans APRN   metoprolol tartrate (LOPRESSOR) 25 MG tablet Take 1 tablet by mouth 2 (Two) Times a Day for 90 days. 11/28/23 9/4/24  Kiesha Evans APRN   multivitamin with minerals (MULTIVITAMIN ADULT PO) Take 1 tablet by mouth Daily. 4/20/22   Kiesha Evans APRN   traMADol (ULTRAM) 50 MG tablet Take 1 tablet by mouth Every 8 (Eight) Hours As Needed for Moderate Pain or Severe Pain. 10/9/24   Kiesha Evans APRN        Social History:   Social History     Tobacco Use    Smoking status: Former     Current packs/day: 0.00     Average packs/day: 0.5 packs/day for 41.7 years (20.8 ttl pk-yrs)     Types: Cigarettes     Start date: 1980     Quit date: 9/2/2021     Years since quitting: 3.2     Passive exposure: Past    Smokeless tobacco: Never    Tobacco comments:     SOME SECOND HAND SMOKE EXPOSURE   Vaping Use    Vaping status: Never Used   Substance Use Topics    Alcohol use: Defer    Drug use: Never         Review of Systems:  Review of Systems   Constitutional:  Negative for chills and fever.   HENT:  Negative for congestion, ear  "pain and sore throat.    Eyes:  Negative for pain.   Respiratory:  Negative for cough, chest tightness and shortness of breath.    Cardiovascular:  Negative for chest pain.   Gastrointestinal:  Negative for abdominal pain, diarrhea, nausea and vomiting.   Genitourinary:  Negative for flank pain and hematuria.   Musculoskeletal:  Positive for arthralgias, gait problem and joint swelling.   Skin:  Negative for pallor.   Neurological:  Negative for seizures and headaches.   All other systems reviewed and are negative.       Physical Exam:  /80   Pulse 87   Temp 97.9 °F (36.6 °C)   Resp 18   Ht 162.6 cm (64\")   Wt 96 kg (211 lb 10.3 oz)   SpO2 91%   BMI 36.33 kg/m²     Physical Exam  Vitals and nursing note reviewed.   Constitutional:       General: She is not in acute distress.     Appearance: Normal appearance. She is not toxic-appearing.   HENT:      Head: Normocephalic and atraumatic.      Mouth/Throat:      Mouth: Mucous membranes are moist.   Eyes:      General: No scleral icterus.  Cardiovascular:      Rate and Rhythm: Normal rate and regular rhythm.      Pulses: Normal pulses.      Heart sounds: Normal heart sounds.   Pulmonary:      Effort: Pulmonary effort is normal. No respiratory distress.      Breath sounds: Normal breath sounds.   Abdominal:      General: Abdomen is flat.      Palpations: Abdomen is soft.      Tenderness: There is no abdominal tenderness.   Musculoskeletal:         General: Swelling, tenderness and deformity present. Normal range of motion.      Cervical back: Normal range of motion and neck supple.      Comments: Left knee tenderness deformity and swelling with normal distal neurovascular   Skin:     General: Skin is warm and dry.      Capillary Refill: Capillary refill takes less than 2 seconds.   Neurological:      General: No focal deficit present.      Mental Status: She is alert and oriented to person, place, and time. Mental status is at baseline.   Psychiatric:         " Mood and Affect: Mood normal.         Behavior: Behavior normal.                  Procedures:  Procedures      Medical Decision Making:      Comorbidities that affect care:    Diabetes, Hypertension, Obesity    External Notes reviewed:    Previous Clinic Note: Numerous primary care office visits.      The following orders were placed and all results were independently analyzed by me:  Orders Placed This Encounter   Procedures    XR Knee 1 or 2 View Left    XR Shoulder 2+ View Left    XR Humerus Left    XR Chest 1 View    Comprehensive Metabolic Panel    Protime-INR    Urinalysis With Culture If Indicated - Urine, Clean Catch    CBC Auto Differential    Obtain & Apply The Following- Lower extremity; Knee immobilizer    IP General Consult (Use specialty-specific consult if known)    ECG 12 Lead Rhythm Change    CBC & Differential       Medications Given in the Emergency Department:  Medications   lactated ringers bolus 1,000 mL (1,000 mL Intravenous New Bag 11/19/24 1508)   HYDROmorphone (DILAUDID) injection 1 mg (1 mg Intravenous Given 11/19/24 1501)   ondansetron (ZOFRAN) injection 4 mg (4 mg Intravenous Given 11/19/24 1500)        ED Course:       EKG: Sinus rhythm rate 89 bpm   no acute ischemia.          Labs:    Lab Results (last 24 hours)       Procedure Component Value Units Date/Time    CBC & Differential [[id number]]  (Abnormal) Collected: 11/19/24 1540    Specimen: Blood from Arm, Right Updated: 11/19/24 1544    Narrative:      The following orders were created for panel order CBC & Differential.  Procedure                               Abnormality         Status                     ---------                               -----------         ------                     CBC Auto Differential[[id number]]        Abnormal            Final result                 Please view results for these tests on the individual orders.    Comprehensive Metabolic Panel [[id number]] Collected: 11/19/24 1540    Specimen: Blood from  Arm, Right Updated: 11/19/24 1541    Protime-INR [[id number]] Collected: 11/19/24 1540    Specimen: Blood from Arm, Right Updated: 11/19/24 1541    CBC Auto Differential [[id number]]  (Abnormal) Collected: 11/19/24 1540    Specimen: Blood from Arm, Right Updated: 11/19/24 1544     WBC 15.22 10*3/mm3      RBC 4.79 10*6/mm3      Hemoglobin 15.1 g/dL      Hematocrit 43.6 %      MCV 91.0 fL      MCH 31.5 pg      MCHC 34.6 g/dL      RDW 12.1 %      RDW-SD 40.3 fl      MPV 11.2 fL      Platelets 203 10*3/mm3      Neutrophil % 80.4 %      Lymphocyte % 12.9 %      Monocyte % 5.1 %      Eosinophil % 0.5 %      Basophil % 0.7 %      Immature Grans % 0.4 %      Neutrophils, Absolute 12.24 10*3/mm3      Lymphocytes, Absolute 1.97 10*3/mm3      Monocytes, Absolute 0.77 10*3/mm3      Eosinophils, Absolute 0.08 10*3/mm3      Basophils, Absolute 0.10 10*3/mm3      Immature Grans, Absolute 0.06 10*3/mm3      nRBC 0.0 /100 WBC              Imaging:    XR Chest 1 View    Result Date: 11/19/2024  XR CHEST 1 VW Date of Exam: 11/19/2024 2:46 PM EST Indication: Preop Comparison: None available. Findings: Cardiomediastinal silhouette is within normal limits. No focal consolidation or overt pulmonary edema. No pleural effusion or pneumothorax. Degenerative changes of the shoulders.     Impression: No evidence of acute cardiopulmonary disease. Electronically Signed: Lazarus Aivles MD  11/19/2024 3:06 PM EST  Workstation ID: [id number]    XR Shoulder 2+ View Left    Result Date: 11/19/2024  XR SHOULDER 2+ VW LEFT, XR HUMERUS LEFT Date of Exam: 11/19/2024 2:08 PM EST Indication: Injury Comparison: Left shoulder 8/31/2022 Findings: No fractures are visualized. Moderate to marked degenerative change consistent with osteoarthritis in the glenohumeral joint appears worsened. Moderate AC arthrosis is evident. The interval between the humeral head and acromion is narrowed, consistent with chronic rotator cuff tear. Mild degenerative change consistent  with osteoarthritis is seen in the elbow joint. No abnormality is seen at the left lung apex.     Impression: Left shoulder and left humerus series demonstrating no acute bony abnormality. Degenerative changes, as above. Electronically Signed: Amador Mahmood MD  11/19/2024 2:30 PM EST  Workstation ID: [id number]    XR Humerus Left    Result Date: 11/19/2024  XR SHOULDER 2+ VW LEFT, XR HUMERUS LEFT Date of Exam: 11/19/2024 2:08 PM EST Indication: Injury Comparison: Left shoulder 8/31/2022 Findings: No fractures are visualized. Moderate to marked degenerative change consistent with osteoarthritis in the glenohumeral joint appears worsened. Moderate AC arthrosis is evident. The interval between the humeral head and acromion is narrowed, consistent with chronic rotator cuff tear. Mild degenerative change consistent with osteoarthritis is seen in the elbow joint. No abnormality is seen at the left lung apex.     Impression: Left shoulder and left humerus series demonstrating no acute bony abnormality. Degenerative changes, as above. Electronically Signed: Amador Mahmood MD  11/19/2024 2:30 PM EST  Workstation ID: [id number]    XR Knee 1 or 2 View Left    Result Date: 11/19/2024  XR KNEE 1 OR 2 VW LEFT Date of Exam: 11/19/2024 2:08 PM EST Indication: Injury Comparison: None available. Findings: Comminuted periprosthetic fracture of the distal femur with posterior angulation, foreshortening and medial displacement. Fracture fragments extend distally and medially along the prosthesis. A displaced butterfly fragment is also noted centrally extending laterally. There is a large amount of fluid compatible with hemarthrosis. Tibial portion of the total knee prosthesis appears intact.     Impression: Comminuted periprosthetic femoral fracture of total knee arthroplasty as above Electronically Signed: Crescencio Dinero MD  11/19/2024 2:30 PM EST  Workstation ID: [id number]       Differential Diagnosis and Discussion:    Trauma:   Differential diagnosis considered but not limited to were subarachnoid hemorrhage, intracranial bleeding, pneumothorax, cardiac contusion, lung contusion, intra-abdominal bleeding, and compartment syndrome of any extremity or other significant traumatic pathology    All labs were reviewed and interpreted by me.  All X-rays impressions were independently interpreted by me.  EKG was interpreted by me.    MDM                     Patient Care Considerations:    CT EXTREMITY: I considered ordering an extremity CT, however x-ray is adequate for ED evaluation      Consultants/Shared Management Plan:    Consultant: I have discussed the case with Dr. Han who states the patient should be transferred to a tertiary trauma center for tertiary orthopedic care  Transfer Provider: I have discussed the case with Ramya Alaniz at [facility] who agrees to accept the patient as a transfer.    Social Determinants of Health:    Patient is unable to carry out activities of daily life. Escalation of care is necessary.       Disposition and Care Coordination:    Transferred: Through independent evaluation of the patient's history, physical, and imperical data, the patient meets criteria to be transferred to another hospital for evaluation/admission.        Final diagnoses:   Closed fracture of distal end of left femur, unspecified fracture morphology, initial encounter        ED Disposition       ED Disposition   Transfer to Another Facility     Condition   --    Comment   --               This medical record created using voice recognition software.             Victor Manuel Bronson, DO  11/19/24 1549

## 2024-11-25 ENCOUNTER — TELEPHONE (OUTPATIENT)
Dept: FAMILY MEDICINE CLINIC | Facility: CLINIC | Age: 64
End: 2024-11-25

## 2024-11-25 NOTE — PROGRESS NOTES
@1538 I called and spoke with patient, she states that she fell Tuesday 11/19/2024 at work she was seen at [facility] and was transferred up to [facility] due to a femur fracture.  Per patient she had surgery on Wednesday to repair the fracture and was released from [facility] on Saturday, 11/23/2024.  Patient states that she never had any physical therapy to come in and see her.  She was not offered physical therapy for home.  She was never given a bedside commode.  I was not able to evaluate [facility] discharge summary due to it not being in the chart, I was able to see her 2D echo and x-rays but no other documentations have been uploaded in care everywhere to review.  I was able to see patient's note where she was seen at [facility] today.  It did look like case management came in to see patient today per case management note patient has been provided a wheelchair and bedside commode per her friends and neighbors.  Patient is unable to weight-bear and cannot do anything for herself.  States she is unable to care for self at home friends and neighbors are trying to help her some but it is very limited.  Case management offered home health but home health would only be able to come in at most 2 times a week.  Per case management they were unable to offer her acute rehab because there was no physical therapy notes.  Apparently physical therapy cannot see patient while she is in the emergency room to evaluate.  Patient is very frustrated, is not sure what else to do.    At 1541 I had a phone call with [facility] management to see what else we can do to aid patient with the rehab for her femur fracture since she lives alone and is unable to care for herself.    @1600 spoke with case management she had gotten a hold of [facility] who was going to evaluate patient to see if they can get her into an inpatient SNF before being discharged  home.    @1607, spoke with patient to see if physical therapy could evaluate her in the emergency room at [facility], will call [facility] to try to get her discharge summary updated into the system.    @1630 was able to discuss can up plan of care with patient, patient was updated by the ER physician she will be admitted to the hospital with pulmonary emboli, PT and OT will be able to evaluate patient once in the hospital and will be discharged home to appropriate settings.  I did update case management to let them know patient will be inpatient admitted to the hospital.  Case management will continue to update [facility] so they are aware of her status and follow her through her hospital stay.  Discussed with patient to call with any questions or concerns.  Patient verbalized understanding agreeable with current treatment plan.

## 2024-11-25 NOTE — TELEPHONE ENCOUNTER
Caller: Adore Neves    Relationship: Self    Best call back number: [phone]    What is the best time to reach you: ANYTIME    Who are you requesting to speak with (clinical staff, provider,  specific staff member): NURSE OR HUSAM GONZALEZ     What was the call regarding: PATIENT FELL AND BROKE HER LEG HAD TO HAVE SURGERY ON WEDNESDAY THEY SENT HER HOME ON THURSDAY, BUT WITH NO INSTRUCTIONS ON WHAT TO DO, NO PHYSICAL THERAPY NOTHING, SHE NEEDS HELP.

## 2024-12-13 DIAGNOSIS — E11.9 TYPE 2 DIABETES MELLITUS WITHOUT COMPLICATION, WITHOUT LONG-TERM CURRENT USE OF INSULIN: ICD-10-CM

## 2024-12-13 RX ORDER — LANCETS 30 GAUGE
1 EACH MISCELLANEOUS 2 TIMES DAILY PRN
Qty: 200 EACH | Refills: 2 | Status: SHIPPED | OUTPATIENT
Start: 2024-12-13

## 2024-12-13 RX ORDER — GLYBURIDE 5 MG/1
5 TABLET ORAL 2 TIMES DAILY WITH MEALS
Qty: 180 TABLET | Refills: 1 | Status: SHIPPED | OUTPATIENT
Start: 2024-12-13

## 2024-12-13 RX ORDER — FENOFIBRATE 160 MG/1
160 TABLET ORAL DAILY
Qty: 90 TABLET | Refills: 1 | Status: SHIPPED | OUTPATIENT
Start: 2024-12-13

## 2024-12-13 RX ORDER — ATORVASTATIN CALCIUM 80 MG/1
80 TABLET, FILM COATED ORAL DAILY
Qty: 90 TABLET | Refills: 1 | Status: SHIPPED | OUTPATIENT
Start: 2024-12-13

## 2024-12-14 LAB
QT INTERVAL: 352 MS
QTC INTERVAL: 429 MS

## 2025-01-09 DIAGNOSIS — Z76.0 MEDICATION REFILL: ICD-10-CM

## 2025-01-09 RX ORDER — TRAMADOL HYDROCHLORIDE 50 MG/1
50 TABLET ORAL EVERY 8 HOURS PRN
Qty: 120 TABLET | Refills: 1 | Status: SHIPPED | OUTPATIENT
Start: 2025-01-09

## 2025-01-13 DIAGNOSIS — Z76.0 MEDICATION REFILL: ICD-10-CM

## 2025-01-15 RX ORDER — TRAMADOL HYDROCHLORIDE 50 MG/1
50 TABLET ORAL EVERY 8 HOURS PRN
Qty: 120 TABLET | Refills: 1 | Status: SHIPPED | OUTPATIENT
Start: 2025-01-15

## 2025-01-27 ENCOUNTER — TELEPHONE (OUTPATIENT)
Dept: FAMILY MEDICINE CLINIC | Facility: CLINIC | Age: 65
End: 2025-01-27

## 2025-02-12 ENCOUNTER — OFFICE VISIT (OUTPATIENT)
Dept: FAMILY MEDICINE CLINIC | Facility: CLINIC | Age: 65
End: 2025-02-12
Payer: COMMERCIAL

## 2025-02-12 VITALS
DIASTOLIC BLOOD PRESSURE: 70 MMHG | BODY MASS INDEX: 35 KG/M2 | HEIGHT: 64 IN | SYSTOLIC BLOOD PRESSURE: 132 MMHG | HEART RATE: 96 BPM | TEMPERATURE: 98 F | WEIGHT: 205 LBS | OXYGEN SATURATION: 98 %

## 2025-02-12 DIAGNOSIS — E66.01 CLASS 2 SEVERE OBESITY DUE TO EXCESS CALORIES WITH SERIOUS COMORBIDITY AND BODY MASS INDEX (BMI) OF 35.0 TO 35.9 IN ADULT: ICD-10-CM

## 2025-02-12 DIAGNOSIS — H25.9 AGE-RELATED CATARACT OF BOTH EYES, UNSPECIFIED AGE-RELATED CATARACT TYPE: ICD-10-CM

## 2025-02-12 DIAGNOSIS — Z00.00 ANNUAL PHYSICAL EXAM: Primary | ICD-10-CM

## 2025-02-12 DIAGNOSIS — E11.65 TYPE 2 DIABETES MELLITUS WITH HYPERGLYCEMIA, WITHOUT LONG-TERM CURRENT USE OF INSULIN: ICD-10-CM

## 2025-02-12 DIAGNOSIS — E78.2 MIXED HYPERLIPIDEMIA: ICD-10-CM

## 2025-02-12 DIAGNOSIS — E03.9 ACQUIRED HYPOTHYROIDISM: ICD-10-CM

## 2025-02-12 DIAGNOSIS — E66.812 CLASS 2 SEVERE OBESITY DUE TO EXCESS CALORIES WITH SERIOUS COMORBIDITY AND BODY MASS INDEX (BMI) OF 35.0 TO 35.9 IN ADULT: ICD-10-CM

## 2025-02-12 DIAGNOSIS — I10 ESSENTIAL HYPERTENSION: ICD-10-CM

## 2025-02-12 DIAGNOSIS — E55.9 VITAMIN D DEFICIENCY: ICD-10-CM

## 2025-02-12 DIAGNOSIS — S72.90XA: ICD-10-CM

## 2025-02-12 LAB
ALBUMIN SERPL-MCNC: 4.2 G/DL (ref 3.5–5.2)
ALBUMIN UR-MCNC: 1.3 MG/DL
ALBUMIN/GLOB SERPL: 1.4 G/DL
ALP SERPL-CCNC: 71 U/L (ref 39–117)
ALT SERPL W P-5'-P-CCNC: 14 U/L (ref 1–33)
ANION GAP SERPL CALCULATED.3IONS-SCNC: 9.7 MMOL/L (ref 5–15)
AST SERPL-CCNC: 16 U/L (ref 1–32)
BASOPHILS # BLD AUTO: 0.05 10*3/MM3 (ref 0–0.2)
BASOPHILS NFR BLD AUTO: 0.6 % (ref 0–1.5)
BILIRUB SERPL-MCNC: <0.2 MG/DL (ref 0–1.2)
BUN SERPL-MCNC: 11 MG/DL (ref 8–23)
BUN/CREAT SERPL: 15.9 (ref 7–25)
CALCIUM SPEC-SCNC: 10.4 MG/DL (ref 8.6–10.5)
CHLORIDE SERPL-SCNC: 101 MMOL/L (ref 98–107)
CHOLEST SERPL-MCNC: 122 MG/DL (ref 0–200)
CO2 SERPL-SCNC: 27.3 MMOL/L (ref 22–29)
CREAT SERPL-MCNC: 0.69 MG/DL (ref 0.57–1)
CREAT UR-MCNC: 65.7 MG/DL
DEPRECATED RDW RBC AUTO: 41.5 FL (ref 37–54)
EGFRCR SERPLBLD CKD-EPI 2021: 97.7 ML/MIN/1.73
EOSINOPHIL # BLD AUTO: 0.24 10*3/MM3 (ref 0–0.4)
EOSINOPHIL NFR BLD AUTO: 2.6 % (ref 0.3–6.2)
ERYTHROCYTE [DISTWIDTH] IN BLOOD BY AUTOMATED COUNT: 12.2 % (ref 12.3–15.4)
GLOBULIN UR ELPH-MCNC: 3 GM/DL
GLUCOSE SERPL-MCNC: 87 MG/DL (ref 65–99)
HBA1C MFR BLD: 7.1 % (ref 4.8–5.6)
HCT VFR BLD AUTO: 49.5 % (ref 34–46.6)
HDLC SERPL-MCNC: 42 MG/DL (ref 40–60)
HGB BLD-MCNC: 15.9 G/DL (ref 12–15.9)
IMM GRANULOCYTES # BLD AUTO: 0.03 10*3/MM3 (ref 0–0.05)
IMM GRANULOCYTES NFR BLD AUTO: 0.3 % (ref 0–0.5)
LDLC SERPL CALC-MCNC: 53 MG/DL (ref 0–100)
LDLC/HDLC SERPL: 1.13 {RATIO}
LYMPHOCYTES # BLD AUTO: 3.27 10*3/MM3 (ref 0.7–3.1)
LYMPHOCYTES NFR BLD AUTO: 36 % (ref 19.6–45.3)
MCH RBC QN AUTO: 29.8 PG (ref 26.6–33)
MCHC RBC AUTO-ENTMCNC: 32.1 G/DL (ref 31.5–35.7)
MCV RBC AUTO: 92.9 FL (ref 79–97)
MICROALBUMIN/CREAT UR: 19.8 MG/G (ref 0–29)
MONOCYTES # BLD AUTO: 0.56 10*3/MM3 (ref 0.1–0.9)
MONOCYTES NFR BLD AUTO: 6.2 % (ref 5–12)
NEUTROPHILS NFR BLD AUTO: 4.94 10*3/MM3 (ref 1.7–7)
NEUTROPHILS NFR BLD AUTO: 54.3 % (ref 42.7–76)
NRBC BLD AUTO-RTO: 0 /100 WBC (ref 0–0.2)
PLATELET # BLD AUTO: 260 10*3/MM3 (ref 140–450)
PMV BLD AUTO: 11.1 FL (ref 6–12)
POTASSIUM SERPL-SCNC: 4.7 MMOL/L (ref 3.5–5.2)
PROT SERPL-MCNC: 7.2 G/DL (ref 6–8.5)
RBC # BLD AUTO: 5.33 10*6/MM3 (ref 3.77–5.28)
SODIUM SERPL-SCNC: 138 MMOL/L (ref 136–145)
T-UPTAKE NFR SERPL: 1.15 TBI (ref 0.8–1.3)
T4 SERPL-MCNC: 10.2 MCG/DL (ref 4.5–11.7)
TRIGL SERPL-MCNC: 163 MG/DL (ref 0–150)
TSH SERPL DL<=0.05 MIU/L-ACNC: 0.63 UIU/ML (ref 0.27–4.2)
VLDLC SERPL-MCNC: 27 MG/DL (ref 5–40)
WBC NRBC COR # BLD AUTO: 9.09 10*3/MM3 (ref 3.4–10.8)

## 2025-02-12 PROCEDURE — 83036 HEMOGLOBIN GLYCOSYLATED A1C: CPT | Performed by: NURSE PRACTITIONER

## 2025-02-12 PROCEDURE — 82043 UR ALBUMIN QUANTITATIVE: CPT | Performed by: NURSE PRACTITIONER

## 2025-02-12 PROCEDURE — 82570 ASSAY OF URINE CREATININE: CPT | Performed by: NURSE PRACTITIONER

## 2025-02-12 PROCEDURE — 80061 LIPID PANEL: CPT | Performed by: NURSE PRACTITIONER

## 2025-02-12 PROCEDURE — 84479 ASSAY OF THYROID (T3 OR T4): CPT | Performed by: NURSE PRACTITIONER

## 2025-02-12 PROCEDURE — 84436 ASSAY OF TOTAL THYROXINE: CPT | Performed by: NURSE PRACTITIONER

## 2025-02-12 PROCEDURE — 99396 PREV VISIT EST AGE 40-64: CPT | Performed by: NURSE PRACTITIONER

## 2025-02-12 PROCEDURE — 80050 GENERAL HEALTH PANEL: CPT | Performed by: NURSE PRACTITIONER

## 2025-02-12 PROCEDURE — 99214 OFFICE O/P EST MOD 30 MIN: CPT | Performed by: NURSE PRACTITIONER

## 2025-02-12 RX ORDER — HYDROCODONE BITARTRATE AND ACETAMINOPHEN 10; 325 MG/1; MG/1
1 TABLET ORAL EVERY 6 HOURS PRN
Qty: 12 TABLET | Refills: 0 | Status: SHIPPED | OUTPATIENT
Start: 2025-02-12

## 2025-02-12 NOTE — PROGRESS NOTES
"  ENCOUNTER DATE:  02/12/2025    Adore Neves / 64 y.o. / female      CHIEF COMPLAINT     Annual Exam and Diabetes      VITALS     Visit Vitals  /70   Pulse 96   Temp 98 °F (36.7 °C)   Ht 162.6 cm (64\")   Wt 93 kg (205 lb)   SpO2 98%   BMI 35.19 kg/m²       BP Readings from Last 3 Encounters:   02/12/25 132/70   11/19/24 159/80   09/04/24 138/66     Wt Readings from Last 3 Encounters:   02/12/25 93 kg (205 lb)   11/19/24 96 kg (211 lb 10.3 oz)   09/04/24 93.4 kg (205 lb 14.4 oz)      Body mass index is 35.19 kg/m².    MEDICATIONS     Current Outpatient Medications on File Prior to Visit   Medication Sig Dispense Refill    albuterol sulfate  (90 Base) MCG/ACT inhaler Inhale 2 puffs Every 4 (Four) Hours As Needed for Wheezing. 8.5 g 2    atorvastatin (LIPITOR) 80 MG tablet Take 1 tablet by mouth Daily. 90 tablet 1    ergocalciferol (ERGOCALCIFEROL) 1.25 MG (82617 UT) capsule Take 1 capsule by mouth 1 (One) Time Per Week. 13 capsule 0    fenofibrate 160 MG tablet Take 1 tablet by mouth Daily. 90 tablet 1    fexofenadine (Allergy Relief) 180 MG tablet Take 1 tablet by mouth Daily. 90 tablet 1    gabapentin (NEURONTIN) 600 MG tablet Take 1 tablet by mouth 4 (Four) Times a Day. 360 tablet 1    glucose blood test strip 1 each by Other route As Needed (USE 2 TIMES NEWBERRY UP TO 3 TIMES A DAY IF NEEDED). Use as instructed UP TO 3 TIMES A  each 2    glyburide (DIAbeta) 5 MG tablet Take 1 tablet by mouth 2 (Two) Times a Day With Meals. 180 tablet 1    Lancets misc Use 1 each 2 (Two) Times a Day As Needed (US 2 TIMES NEWBERRY UP TO 3 TIMES A DAY IF NEEDED). 200 each 2    metFORMIN (GLUCOPHAGE) 1000 MG tablet Take 1 tablet by mouth 2 (Two) Times a Day With Meals. 180 tablet 1    metoprolol tartrate (LOPRESSOR) 25 MG tablet Take 1 tablet by mouth 2 (Two) Times a Day for 90 days. 180 tablet 1    multivitamin with minerals (Zechariah-Day 1000) tablet tablet Take 2 tablets by mouth Daily.      multivitamin with " "minerals (MULTIVITAMIN ADULT PO) Take 1 tablet by mouth Daily. 90 tablet 1    traMADol (ULTRAM) 50 MG tablet Take 1 tablet by mouth Every 8 (Eight) Hours As Needed for Moderate Pain. 120 tablet 1     No current facility-administered medications on file prior to visit.         HISTORY OF PRESENT ILLNESS     Adore presents for annual health maintenance visit.  No results found for: \"HPVAPTIMA\"   Last health maintenance visit: approximately 1 year ago  General health: some medical problems  Lifestyle:  Attempting to lose weight?: Yes   Diet: eating healthier  Exercise: engages in strenuous physical activity on regular basis   Tobacco: Former smoker   Alcohol: does not drink  Work: Full-time  Reproductive health:  Sexually active?: No   Sexual problems?: No problems  Concern for STD?: No    Sees Gynecologist?: No   Annette/Postmenopausal?: Yes   Domestic abuse concerns: No   Depression Screening:          PHQ-9 Depression Screening  Little interest or pleasure in doing things? Not at all   Feeling down, depressed, or hopeless? Not at all   PHQ-2 Total Score 0   Trouble falling or staying asleep, or sleeping too much?     Feeling tired or having little energy?     Poor appetite or overeating?     Feeling bad about yourself - or that you are a failure or have let yourself or your family down?     Trouble concentrating on things, such as reading the newspaper or watching television?     Moving or speaking so slowly that other people could have noticed? Or the opposite - being so fidgety or restless that you have been moving around a lot more than usual?     Thoughts that you would be better off dead, or of hurting yourself in some way?     PHQ-9 Total Score     If you checked off any problems, how difficult have these problems made it for you to do your work, take care of things at home, or get along with other people? Not difficult at all       MAKAYLA-7           Patient Care Team:  Kiesha Evans APRN as PCP - General " (Nurse Practitioner)      ALLERGIES  No Known Allergies     PFSH:     The following portions of the patient's history were reviewed and updated as appropriate: Allergies / Current Medications / Past Medical History / Surgical History / Social History / Family History    PROBLEM LIST   Patient Active Problem List   Diagnosis    Arthritis    Class 2 severe obesity due to excess calories with serious comorbidity and body mass index (BMI) of 35.0 to 35.9 in adult       PAST MEDICAL HISTORY  Past Medical History:   Diagnosis Date    Chronic pain     COPD (chronic obstructive pulmonary disease)     Diabetes mellitus     Hyperlipidemia     Hypertension     Vitamin D deficiency        SURGICAL HISTORY  Past Surgical History:   Procedure Laterality Date    ABDOMINAL HYSTERECTOMY      LAPAROSCOPIC CHOLECYSTECTOMY      REPLACEMENT TOTAL KNEE BILATERAL      TONSILLECTOMY         SOCIAL HISTORY  Social History     Socioeconomic History    Marital status:    Tobacco Use    Smoking status: Former     Current packs/day: 0.00     Average packs/day: 0.5 packs/day for 41.7 years (20.8 ttl pk-yrs)     Types: Cigarettes     Start date: 1980     Quit date: 9/2/2021     Years since quitting: 3.4     Passive exposure: Past    Smokeless tobacco: Never    Tobacco comments:     SOME SECOND HAND SMOKE EXPOSURE   Vaping Use    Vaping status: Never Used   Substance and Sexual Activity    Alcohol use: Defer    Drug use: Never    Sexual activity: Defer       FAMILY HISTORY  Family History   Problem Relation Age of Onset    Heart disease Mother     Heart disease Father     Diabetes Brother     Nephrolithiasis Brother     Other Brother         BLADDER CALCULUS    Heart disease Other        IMMUNIZATION HISTORY  Immunization History   Administered Date(s) Administered    Fluzone (or Fluarix & Flulaval for VFC) >6mos 01/20/2022, 10/19/2022, 01/10/2024    Influenza, Unspecified 10/07/2020    Pneumococcal Conjugate 13-Valent (PCV13) 01/15/2020     Pneumococcal Conjugate 20-Valent (PCV20) 10/19/2022         HPI  Diabetes  She presents for her follow-up diabetic visit. She has type 2 diabetes mellitus. Her disease course has been stable. There are no hypoglycemic associated symptoms. There are no diabetic associated symptoms. There are no hypoglycemic complications. Risk factors for coronary artery disease include diabetes mellitus, dyslipidemia, obesity, post-menopausal and hypertension. Current diabetic treatment includes oral agent (triple therapy). She is compliant with treatment all of the time. She is following a generally healthy diet. When asked about meal planning, she reported none. An ACE inhibitor/angiotensin II receptor blocker is being taken.   Hypertension  This is a chronic problem. The current episode started more than 1 year ago. Pertinent negatives include no anxiety, peripheral edema or shortness of breath. Risk factors for coronary artery disease include dyslipidemia, diabetes mellitus, post-menopausal state and obesity. Past treatments include beta blockers. Current antihypertension treatment includes beta blockers. The current treatment provides significant improvement. There are no compliance problems.    Hyperlipidemia  This is a chronic problem. The current episode started more than 1 year ago. Exacerbating diseases include diabetes and obesity. Pertinent negatives include no shortness of breath. Current antihyperlipidemic treatment includes statins. The current treatment provides moderate improvement of lipids. Risk factors for coronary artery disease include diabetes mellitus, family history, dyslipidemia, hypertension, obesity and post-menopausal.      History of Present Illness  The patient presents for evaluation of a broken femur, diabetes mellitus, and cataracts.    She reports an improvement in her condition, with regained mobility allowing her to ascend stairs and enter her home independently. She was previously  hospitalized at [facility] due to the discovery of pulmonary emboli, which necessitated a 4 to 5-day stay at [facility]. During this period, she underwent physical therapy sessions that enabled her to navigate her home environment. She was provided with a wheelchair and a bedside commode but experienced difficulty in using them, leading to temporary incontinence management with diapers. She was discharged from [facility] on a Monday or Tuesday, feeling sufficiently strong to return home. She has not yet resumed work due to lifting restrictions and is scheduled for a follow-up appointment with Dr. Kauffman  in 04/2025. She is currently on company leave and is responsible for her insurance payments. She has been advised against driving and strenuous activities such as lifting, pulling, and shoving. She is able to lift approximately 20 pounds when grocery shopping. She has been managing her pain with tramadol, taking two tablets at a time. She has also been using marijuana for neuropathy relief and sleep aid. She has been prescribed insulin for diabetes management during her hospital stay. She has been monitoring her blood glucose levels twice daily, noting that they are typically within normal range in the morning but tend to increase by afternoon. She has experienced one episode of hypoglycemia, with a blood glucose level of 40, which was managed with orange juice.    She recently visited her ophthalmologist and was diagnosed with small cataracts. There is no ocular damage related to her diabetes. She has been prescribed part-time glasses for driving.    She has been adhering to a balanced diet, although she acknowledges a need to increase her vegetable intake. She has been making efforts to reduce her carbohydrate consumption. She has been taking calcium supplements as recommended by Dr. Kauffman .    SOCIAL HISTORY  She does not smoke.    MEDICATIONS  Current: Tramadol, glyburide,  "metformin, Ozempic.         Physical Exam  Vitals reviewed.   Constitutional:       General: She is not in acute distress.     Appearance: Normal appearance. She is well-developed. She is obese. She is not ill-appearing.   HENT:      Head: Normocephalic and atraumatic.   Eyes:      Conjunctiva/sclera: Conjunctivae normal.      Pupils: Pupils are equal, round, and reactive to light.   Cardiovascular:      Rate and Rhythm: Normal rate and regular rhythm.      Heart sounds: No murmur heard.  Pulmonary:      Effort: Pulmonary effort is normal.      Breath sounds: Normal breath sounds. No wheezing.   Skin:     General: Skin is warm and dry.   Neurological:      Mental Status: She is alert and oriented to person, place, and time.      Gait: Gait abnormal (using cane).   Psychiatric:         Mood and Affect: Mood and affect normal.         Behavior: Behavior normal.         Thought Content: Thought content normal.         Judgment: Judgment normal.         REVIEWED DATA      Labs:    Lab Results   Component Value Date     (L) 11/19/2024    K 4.9 11/19/2024    CALCIUM 9.6 11/19/2024    AST 10 11/19/2024    ALT 13 11/19/2024    BUN 16 11/19/2024    CREATININE 0.57 11/19/2024    CREATININE 0.58 05/02/2024    CREATININE 0.80 01/10/2024    EGFRIFNONA 47 (L) 01/20/2022       Lab Results   Component Value Date    HGBA1C 8.00 (H) 05/02/2024    HGBA1C 9.40 (H) 01/10/2024    HGBA1C 8.10 (H) 08/04/2023    TSH 1.570 05/02/2024    FREET4 1.4 03/26/2021       Lab Results   Component Value Date    LDL 84 05/02/2024    HDL 46 05/02/2024    TRIG 107 05/02/2024    CHOLHDLRATIO 2.6 (L) 03/26/2021       Lab Results   Component Value Date    YYXI09ZC 62.3 05/02/2024        Lab Results   Component Value Date    WBC 15.22 (H) 11/19/2024    HGB 15.1 11/19/2024    MCV 91.0 11/19/2024     11/19/2024       No results found for: \"PROTEIN\", \"GLUCOSEU\", \"BLOODU\", \"NITRITEU\", \"LEUKOCYTESUR\"       Lab Results   Component Value Date    " HEPCVIRUSABY NONREACTIVE 11/19/2024           ASSESSMENT & PLAN     Diagnoses and all orders for this visit:    1. Annual physical exam (Primary)    2. Type 2 diabetes mellitus with hyperglycemia, without long-term current use of insulin  -     Albumin/Creatinine Ratio Urine  -     Microalbumin / Creatinine Urine Ratio - Urine, Clean Catch  -     Thyroid Panel With TSH  -     CBC Auto Differential  -     Comprehensive Metabolic Panel  -     Hemoglobin A1c  -     Lipid Panel  -     Insulin Glargine, 1 Unit Dial, (TOUJEO) 300 UNIT/ML solution pen-injector injection; Inject 25 Units under the skin into the appropriate area as directed Every Morning.  Dispense: 7.5 mL; Refill: 3    3. Class 2 severe obesity due to excess calories with serious comorbidity and body mass index (BMI) of 35.0 to 35.9 in adult  -     Insulin Glargine, 1 Unit Dial, (TOUJEO) 300 UNIT/ML solution pen-injector injection; Inject 25 Units under the skin into the appropriate area as directed Every Morning.  Dispense: 7.5 mL; Refill: 3    4. Mixed hyperlipidemia  -     CBC Auto Differential  -     Comprehensive Metabolic Panel  -     Lipid Panel  -     Insulin Glargine, 1 Unit Dial, (TOUJEO) 300 UNIT/ML solution pen-injector injection; Inject 25 Units under the skin into the appropriate area as directed Every Morning.  Dispense: 7.5 mL; Refill: 3    5. Essential hypertension  -     CBC Auto Differential  -     Comprehensive Metabolic Panel  -     Insulin Glargine, 1 Unit Dial, (TOUJEO) 300 UNIT/ML solution pen-injector injection; Inject 25 Units under the skin into the appropriate area as directed Every Morning.  Dispense: 7.5 mL; Refill: 3    6. Vitamin D deficiency    7. Acquired hypothyroidism  -     Thyroid Panel With TSH    8. Displaced fracture of femur  -     HYDROcodone-acetaminophen (NORCO)  MG per tablet; Take 1 tablet by mouth Every 6 (Six) Hours As Needed for Moderate Pain.  Dispense: 12 tablet; Refill: 0    9. Age-related cataract  of both eyes, unspecified age-related cataract type    Other orders  -     naloxone (NARCAN) 4 MG/0.1ML nasal spray; Call 911. Don't prime. [place] in 1 nostril for overdose. Repeat in 2-3 minutes in other nostril if no or minimal breathing/responsiveness.  Dispense: 2 each; Refill: 0  -     glucose 4-6 GM-MG per chewable tablet; Chew 2 tablets As Needed for Low Blood Sugar.  Dispense: 300 tablet; Refill: 3        Assessment & Plan  1. Fractured femur.  The patient's femur fracture is demonstrating signs of healing, albeit at a slow pace. The fixation screws in the left distal femur appear to have slightly backed out laterally since the prior exam, but the knee prosthesis remains intact without signs of loosening or infection. She is advised to continue avoiding lifting, pulling, and shoving to prevent further displacement. A follow-up x-ray will be conducted during her next visit in April to monitor the healing process.    2. Diabetes mellitus.  Her diabetes management will be adjusted. She is advised to maintain a well-balanced diet and monitor her blood sugar levels more frequently, especially in the afternoon when her levels tend to rise. She will start on 25 units of Toujeo insulin daily. If her blood sugar levels drop, she should have glucose tablets or hard candy nearby. A prescription for insulin and glucose tablets will be sent to her pharmacy.    3. Cataracts.  She has been diagnosed with small cataracts. No immediate intervention is required as they are not causing significant issues. She is advised to wear her part-time glasses as needed, especially while driving.    4. Osteopenia.  She has been diagnosed with osteopenia. She is advised to continue taking calcium supplements to support bone health.    Follow-up  The patient will follow up in 3 months.         HEALTHCARE MAINTENANCE ISSUES     Cancer Screening:  Colon: Initial/Next screening at age: CURRENT  Repeat colon cancer screening: every 3  years  Breast: Recommended monthly self exams; annual professional exam  Mammogram: every 1 year  Cervical: N/A s/p total hysterectomy  Skin: Monthly self skin examination, annual exam by health professional      Lifestyle Counseling:  Lifestyle Modifications: Attempt to lose weight, Continue good lifestyle choices/modifications, and Improve dietary compliance  Safety Issues: Always wear seatbelt, Avoid texting while driving   Use sunscreen, regular skin examination  Recommended annual dental/vision examination.  Emotional/Stress/Sleep: Reviewed and  given when appropriate    Part of this note may be electronic transcription/translation of spoken language to printed text using the Dragon dictation system      Patient or patient representative verbalized consent for the use of Ambient Listening during the visit with  HUSAM Brooks for chart documentation. 2/12/2025  14:53 EST

## 2025-02-26 DIAGNOSIS — S72.90XA: ICD-10-CM

## 2025-02-27 RX ORDER — HYDROCODONE BITARTRATE AND ACETAMINOPHEN 10; 325 MG/1; MG/1
1 TABLET ORAL EVERY 6 HOURS PRN
Qty: 12 TABLET | Refills: 0 | Status: SHIPPED | OUTPATIENT
Start: 2025-02-27

## 2025-03-05 ENCOUNTER — TELEPHONE (OUTPATIENT)
Dept: FAMILY MEDICINE CLINIC | Facility: CLINIC | Age: 65
End: 2025-03-05

## 2025-03-05 DIAGNOSIS — S72.92XA CLOSED FRACTURE OF LEFT FEMUR, UNSPECIFIED FRACTURE MORPHOLOGY, UNSPECIFIED PORTION OF FEMUR, INITIAL ENCOUNTER: Primary | ICD-10-CM

## 2025-03-30 DIAGNOSIS — G89.4 CHRONIC PAIN SYNDROME: ICD-10-CM

## 2025-03-30 DIAGNOSIS — Z76.0 MEDICATION REFILL: ICD-10-CM

## 2025-03-30 RX ORDER — GABAPENTIN 600 MG/1
600 TABLET ORAL 4 TIMES DAILY
Qty: 360 TABLET | Refills: 1 | Status: CANCELLED | OUTPATIENT
Start: 2025-03-30

## 2025-04-01 DIAGNOSIS — Z76.0 MEDICATION REFILL: ICD-10-CM

## 2025-04-03 RX ORDER — TRAMADOL HYDROCHLORIDE 50 MG/1
50 TABLET ORAL EVERY 8 HOURS PRN
Qty: 120 TABLET | Refills: 1 | Status: SHIPPED | OUTPATIENT
Start: 2025-04-03

## 2025-04-03 RX ORDER — TRAMADOL HYDROCHLORIDE 50 MG/1
50 TABLET ORAL EVERY 8 HOURS PRN
Qty: 120 TABLET | Refills: 1 | OUTPATIENT
Start: 2025-04-03

## 2025-04-03 NOTE — TELEPHONE ENCOUNTER
Gabapentin is not due until approx 4/22 based on 90 day supply received 1/22/25. Too early for that refill.

## 2025-04-09 DIAGNOSIS — G89.4 CHRONIC PAIN SYNDROME: ICD-10-CM

## 2025-04-10 RX ORDER — GABAPENTIN 600 MG/1
600 TABLET ORAL 4 TIMES DAILY
Qty: 360 TABLET | Refills: 1 | Status: SHIPPED | OUTPATIENT
Start: 2025-04-10

## 2025-04-15 ENCOUNTER — TELEPHONE (OUTPATIENT)
Dept: FAMILY MEDICINE CLINIC | Facility: CLINIC | Age: 65
End: 2025-04-15
Payer: COMMERCIAL

## 2025-04-15 DIAGNOSIS — G89.4 CHRONIC PAIN SYNDROME: ICD-10-CM

## 2025-04-15 NOTE — TELEPHONE ENCOUNTER
Caller: Adore Neves    Relationship to patient: Self    Best call back number: [phone]     Patient is needing: PATIENT STATES SHE PUT A NOTE ON HER REQUEST THAT HER MEDICATION gabapentin (NEURONTIN) 600 MG tablet BE SENT TO   [id number] - [place], [place] - [address] - [phone]  - [phone] FX  THE MEDICATION WAS SENT TO [facility]  PLEASE MAKE THESE CHANGES AND CALL THE PATIENT AT THE NUMBER ABOVE WHEN THIS HAS BEEN DONE.  PATIENT REQUEST ONLY A 30 DAY SUPPLY

## 2025-04-18 RX ORDER — GABAPENTIN 600 MG/1
600 TABLET ORAL 4 TIMES DAILY
Qty: 360 TABLET | Refills: 1 | Status: SHIPPED | OUTPATIENT
Start: 2025-04-18

## 2025-05-19 RX ORDER — ATORVASTATIN CALCIUM 80 MG/1
80 TABLET, FILM COATED ORAL DAILY
Qty: 90 TABLET | Refills: 1 | Status: SHIPPED | OUTPATIENT
Start: 2025-05-19

## 2025-05-19 RX ORDER — FENOFIBRATE 160 MG/1
160 TABLET ORAL DAILY
Qty: 90 TABLET | Refills: 1 | Status: SHIPPED | OUTPATIENT
Start: 2025-05-19